# Patient Record
Sex: FEMALE
[De-identification: names, ages, dates, MRNs, and addresses within clinical notes are randomized per-mention and may not be internally consistent; named-entity substitution may affect disease eponyms.]

---

## 2017-10-10 ENCOUNTER — RX ONLY (RX ONLY)
Age: 77
End: 2017-10-10

## 2017-10-10 ENCOUNTER — APPOINTMENT (OUTPATIENT)
Age: 77
Setting detail: DERMATOLOGY
End: 2017-10-16

## 2017-10-10 DIAGNOSIS — D485 NEOPLASM OF UNCERTAIN BEHAVIOR OF SKIN: ICD-10-CM

## 2017-10-10 DIAGNOSIS — Z85.828 PERSONAL HISTORY OF OTHER MALIGNANT NEOPLASM OF SKIN: ICD-10-CM

## 2017-10-10 PROBLEM — D48.5 NEOPLASM OF UNCERTAIN BEHAVIOR OF SKIN: Status: ACTIVE | Noted: 2017-10-10

## 2017-10-10 PROBLEM — D23.9 OTHER BENIGN NEOPLASM OF SKIN, UNSPECIFIED: Status: ACTIVE | Noted: 2017-10-10

## 2017-10-10 PROCEDURE — OTHER BIOPSY BY SHAVE METHOD: OTHER

## 2017-10-10 PROCEDURE — 99213 OFFICE O/P EST LOW 20 MIN: CPT | Mod: 25

## 2017-10-10 PROCEDURE — OTHER MIPS QUALITY: OTHER

## 2017-10-10 PROCEDURE — OTHER COUNSELING: OTHER

## 2017-10-10 PROCEDURE — 11100: CPT

## 2017-10-10 RX ORDER — SALICYLIC ACID 60 MG/G
CREAM TOPICAL
Qty: 1 | Refills: 3 | Status: ERX

## 2017-10-10 ASSESSMENT — LOCATION SIMPLE DESCRIPTION DERM: LOCATION SIMPLE: NOSE

## 2017-10-10 ASSESSMENT — LOCATION ZONE DERM: LOCATION ZONE: NOSE

## 2017-10-10 ASSESSMENT — LOCATION DETAILED DESCRIPTION DERM: LOCATION DETAILED: NASAL DORSUM

## 2017-10-10 NOTE — PROCEDURE: MIPS QUALITY
Quality 431: Preventive Care And Screening: Unhealthy Alcohol Use - Screening: Patient screened for unhealthy alcohol use using a single question and scores less than 2 times per year
Quality 110: Preventive Care And Screening: Influenza Immunization: Influenza Immunization Administered during Influenza season
Quality 111:Pneumonia Vaccination Status For Older Adults: Pneumococcal Vaccination Previously Received
Quality 226: Preventive Care And Screening: Tobacco Use: Screening And Cessation Intervention: Patient screened for tobacco and is an ex-smoker
Quality 154 Part A: Falls: Risk Assessment (Should Be Reported With Measure 155.): Falls risk assessment completed and documented in the past 12 months.
Detail Level: Detailed
Quality 154 Part B: Falls: Risk Screening (Should Be Reported With Measure 155.): Patient screened for future fall risk; documentation of no falls in the past year or only one fall without injury in the past year

## 2017-10-10 NOTE — PROCEDURE: BIOPSY BY SHAVE METHOD
Curettage Text: The wound bed was treated with curettage after the biopsy was performed.
Detail Level: Simple
Bill For Surgical Tray: no
Electrodesiccation And Curettage Text: The wound bed was treated with electrodesiccation and curettage after the biopsy was performed.
Post-Care Instructions: I reviewed with the patient in detail post-care instructions. Patient is to keep the biopsy site dry overnight, and then apply bacitracin twice daily until healed. Patient may apply hydrogen peroxide soaks to remove any crusting.
Electrodesiccation Text: The wound bed was treated with electrodesiccation after the biopsy was performed.
Silver Nitrate Text: The wound bed was treated with silver nitrate after the biopsy was performed.
Consent: Written consent was obtained and risks were reviewed including but not limited to scarring, infection, bleeding, scabbing, incomplete removal, nerve damage and allergy to anesthesia.
Hemostasis: Aluminum Chloride
Notification Instructions: Patient will be notified of biopsy results. However, patient instructed to call the office if not contacted within 2 weeks.
Biopsy Type: H and E
Additional Anesthesia Volume In Cc (Will Not Render If 0): 0
Anesthesia Volume In Cc (Will Not Render If 0): 2
Dressing: bandage
Billing Type: Third-Party Bill
Wound Care: Vaseline
Cryotherapy Text: The wound bed was treated with cryotherapy after the biopsy was performed.
Biopsy Method: Dermablade
Anesthesia Type: 0.5% bupivacaine with 1:200,000 epinephrine
Type Of Destruction Used: Curettage

## 2017-10-18 ENCOUNTER — APPOINTMENT (OUTPATIENT)
Age: 77
Setting detail: DERMATOLOGY
End: 2017-10-18

## 2017-11-30 ENCOUNTER — APPOINTMENT (OUTPATIENT)
Age: 77
Setting detail: DERMATOLOGY
End: 2017-12-04

## 2017-11-30 DIAGNOSIS — B07.8 OTHER VIRAL WARTS: ICD-10-CM

## 2017-11-30 DIAGNOSIS — L81.4 OTHER MELANIN HYPERPIGMENTATION: ICD-10-CM

## 2017-11-30 PROCEDURE — 99213 OFFICE O/P EST LOW 20 MIN: CPT | Mod: 25

## 2017-11-30 PROCEDURE — OTHER LIQUID NITROGEN: OTHER

## 2017-11-30 PROCEDURE — OTHER COUNSELING: OTHER

## 2017-11-30 PROCEDURE — OTHER CURETTAGE AND DESTRUCTION: OTHER

## 2017-11-30 PROCEDURE — OTHER MIPS QUALITY: OTHER

## 2017-11-30 PROCEDURE — 17110 DESTRUCT B9 LESION 1-14: CPT

## 2017-11-30 ASSESSMENT — LOCATION SIMPLE DESCRIPTION DERM
LOCATION SIMPLE: NOSE
LOCATION SIMPLE: RIGHT FOREARM

## 2017-11-30 ASSESSMENT — LOCATION ZONE DERM
LOCATION ZONE: NOSE
LOCATION ZONE: ARM

## 2017-11-30 ASSESSMENT — LOCATION DETAILED DESCRIPTION DERM
LOCATION DETAILED: RIGHT VENTRAL LATERAL DISTAL FOREARM
LOCATION DETAILED: NASAL ROOT

## 2017-11-30 NOTE — PROCEDURE: CURETTAGE AND DESTRUCTION
Bill For Surgical Tray: no
Size Of Lesion After Curettage: 0
Anesthesia Type: 1% lidocaine with epinephrine
Additional Information: (Optional): The wound was cleaned, and a pressure dressing was applied.  The patient received detailed post-op instructions.
Size Of Lesion In Cm: 0.5
Cautery Type: electrodesiccation
Detail Level: Detailed
Number Of Curettages: 3
What Was Performed First?: Curettage
Post-Care Instructions: I reviewed with the patient in detail post-care instructions. Patient is to keep the area dry for 48 hours, and not to engage in any swimming until the area is healed. Should the patient develop any fevers, chills, bleeding, severe pain patient will contact the office immediately.
Consent was obtained from the patient. The risks, benefits and alternatives to therapy were discussed in detail. Specifically, the risks of infection, scarring, bleeding, prolonged wound healing, nerve injury, incomplete removal, allergy to anesthesia and recurrence were addressed. Alternatives to ED&C, such as: surgical removal and XRT were also discussed.  Prior to the procedure, the treatment site was clearly identified and confirmed by the patient. All components of Universal Protocol/PAUSE Rule completed.
Anesthesia Volume In Cc: -

## 2017-11-30 NOTE — PROCEDURE: LIQUID NITROGEN
Add 52 Modifier (Optional): no
Number Of Freeze-Thaw Cycles: 4 freeze-thaw cycles
Pared With?: 15 blade
Detail Level: Zone
Render Post-Care Instructions In Note?: yes
Medical Necessity Information: It is in your best interest to select a reason for this procedure from the list below. All of these items fulfill various CMS LCD requirements except the new and changing color options.
Consent: Verbal consent

## 2017-11-30 NOTE — PROCEDURE: MIPS QUALITY
Quality 154 Part B: Falls: Risk Screening (Should Be Reported With Measure 155.): Patient screened for future fall risk; documentation of no falls in the past year or only one fall without injury in the past year
Quality 110: Preventive Care And Screening: Influenza Immunization: Influenza Immunization Administered during Influenza season
Quality 431: Preventive Care And Screening: Unhealthy Alcohol Use - Screening: Patient screened for unhealthy alcohol use using a single question and scores less than 2 times per year
Quality 226: Preventive Care And Screening: Tobacco Use: Screening And Cessation Intervention: Patient screened for tobacco and is an ex-smoker
Quality 111:Pneumonia Vaccination Status For Older Adults: Pneumococcal Vaccination Previously Received
Quality 154 Part A: Falls: Risk Assessment (Should Be Reported With Measure 155.): Falls risk assessment completed and documented in the past 12 months.
Detail Level: Detailed

## 2018-08-29 ENCOUNTER — APPOINTMENT (OUTPATIENT)
Age: 78
Setting detail: DERMATOLOGY
End: 2018-09-06

## 2018-08-29 DIAGNOSIS — D22 MELANOCYTIC NEVI: ICD-10-CM

## 2018-08-29 DIAGNOSIS — Z71.89 OTHER SPECIFIED COUNSELING: ICD-10-CM

## 2018-08-29 DIAGNOSIS — L82.1 OTHER SEBORRHEIC KERATOSIS: ICD-10-CM

## 2018-08-29 DIAGNOSIS — L82.0 INFLAMED SEBORRHEIC KERATOSIS: ICD-10-CM

## 2018-08-29 DIAGNOSIS — L81.4 OTHER MELANIN HYPERPIGMENTATION: ICD-10-CM

## 2018-08-29 PROBLEM — D22.9 MELANOCYTIC NEVI, UNSPECIFIED: Status: ACTIVE | Noted: 2018-08-29

## 2018-08-29 PROCEDURE — OTHER COUNSELING: OTHER

## 2018-08-29 PROCEDURE — 99214 OFFICE O/P EST MOD 30 MIN: CPT | Mod: 25

## 2018-08-29 PROCEDURE — 17111 DESTRUCT LESION 15 OR MORE: CPT

## 2018-08-29 PROCEDURE — OTHER PRESCRIPTION: OTHER

## 2018-08-29 PROCEDURE — OTHER LIQUID NITROGEN: OTHER

## 2018-08-29 PROCEDURE — OTHER MIPS QUALITY: OTHER

## 2018-08-29 RX ORDER — AMMONIUM LACTATE 12 G/100G
CREAM TOPICAL
Qty: 160 | Refills: 3 | Status: ERX | COMMUNITY
Start: 2018-08-29

## 2018-08-29 ASSESSMENT — LOCATION ZONE DERM
LOCATION ZONE: NECK
LOCATION ZONE: TRUNK
LOCATION ZONE: ARM

## 2018-08-29 ASSESSMENT — LOCATION SIMPLE DESCRIPTION DERM
LOCATION SIMPLE: CHEST
LOCATION SIMPLE: RIGHT ANTERIOR NECK
LOCATION SIMPLE: LEFT CLAVICULAR SKIN
LOCATION SIMPLE: RIGHT CLAVICULAR SKIN
LOCATION SIMPLE: RIGHT SHOULDER
LOCATION SIMPLE: LEFT ANTERIOR NECK

## 2018-08-29 ASSESSMENT — LOCATION DETAILED DESCRIPTION DERM
LOCATION DETAILED: RIGHT INFERIOR ANTERIOR NECK
LOCATION DETAILED: RIGHT ANTERIOR SHOULDER
LOCATION DETAILED: LEFT SUPERIOR LATERAL NECK
LOCATION DETAILED: RIGHT SUPERIOR ANTERIOR NECK
LOCATION DETAILED: RIGHT CLAVICULAR NECK
LOCATION DETAILED: LEFT SUPERIOR ANTERIOR NECK
LOCATION DETAILED: LEFT LATERAL SUPERIOR CHEST
LOCATION DETAILED: LEFT INFERIOR ANTERIOR NECK
LOCATION DETAILED: RIGHT MEDIAL SUPERIOR CHEST
LOCATION DETAILED: LEFT CLAVICULAR SKIN
LOCATION DETAILED: RIGHT CLAVICULAR SKIN

## 2018-08-29 NOTE — PROCEDURE: LIQUID NITROGEN
Add 52 Modifier (Optional): no
Detail Level: Zone
Number Of Freeze-Thaw Cycles: 4 freeze-thaw cycles
Render Post-Care Instructions In Note?: yes
Pared With?: 15 blade
Medical Necessity Information: It is in your best interest to select a reason for this procedure from the list below. All of these items fulfill various CMS LCD requirements except the new and changing color options.
Consent: Verbal consent

## 2018-08-29 NOTE — PROCEDURE: MIPS QUALITY
Detail Level: Detailed
Quality 431: Preventive Care And Screening: Unhealthy Alcohol Use - Screening: Patient screened for unhealthy alcohol use using a single question and scores less than 2 times per year
Quality 154 Part B: Falls: Risk Screening (Should Be Reported With Measure 155.): Patient screened for future fall risk; documentation of no falls in the past year or only one fall without injury in the past year
Quality 111:Pneumonia Vaccination Status For Older Adults: Pneumococcal Vaccination Previously Received
Quality 226: Preventive Care And Screening: Tobacco Use: Screening And Cessation Intervention: Patient screened for tobacco and is an ex-smoker
Quality 154 Part A: Falls: Risk Assessment (Should Be Reported With Measure 155.): Falls risk assessment completed and documented in the past 12 months.
Quality 110: Preventive Care And Screening: Influenza Immunization: Influenza Immunization Administered during Influenza season

## 2019-02-28 ENCOUNTER — APPOINTMENT (OUTPATIENT)
Age: 79
Setting detail: DERMATOLOGY
End: 2019-03-01

## 2019-02-28 DIAGNOSIS — L82.1 OTHER SEBORRHEIC KERATOSIS: ICD-10-CM

## 2019-02-28 DIAGNOSIS — L81.4 OTHER MELANIN HYPERPIGMENTATION: ICD-10-CM

## 2019-02-28 DIAGNOSIS — D22 MELANOCYTIC NEVI: ICD-10-CM

## 2019-02-28 DIAGNOSIS — L82.0 INFLAMED SEBORRHEIC KERATOSIS: ICD-10-CM

## 2019-02-28 DIAGNOSIS — Z71.89 OTHER SPECIFIED COUNSELING: ICD-10-CM

## 2019-02-28 PROBLEM — D22.5 MELANOCYTIC NEVI OF TRUNK: Status: ACTIVE | Noted: 2019-02-28

## 2019-02-28 PROCEDURE — 17111 DESTRUCT LESION 15 OR MORE: CPT

## 2019-02-28 PROCEDURE — OTHER COUNSELING: OTHER

## 2019-02-28 PROCEDURE — OTHER MIPS QUALITY: OTHER

## 2019-02-28 PROCEDURE — OTHER LIQUID NITROGEN: OTHER

## 2019-02-28 PROCEDURE — 99213 OFFICE O/P EST LOW 20 MIN: CPT | Mod: 25

## 2019-02-28 ASSESSMENT — LOCATION SIMPLE DESCRIPTION DERM
LOCATION SIMPLE: LEFT UPPER BACK
LOCATION SIMPLE: RIGHT SHOULDER
LOCATION SIMPLE: RIGHT CLAVICULAR SKIN
LOCATION SIMPLE: LEFT ANTERIOR NECK
LOCATION SIMPLE: LEFT CLAVICULAR SKIN
LOCATION SIMPLE: LEFT CHEEK
LOCATION SIMPLE: RIGHT UPPER BACK
LOCATION SIMPLE: RIGHT CHEEK
LOCATION SIMPLE: POSTERIOR NECK
LOCATION SIMPLE: RIGHT ANTERIOR NECK
LOCATION SIMPLE: ABDOMEN

## 2019-02-28 ASSESSMENT — LOCATION DETAILED DESCRIPTION DERM
LOCATION DETAILED: RIGHT POSTERIOR SHOULDER
LOCATION DETAILED: RIGHT INFERIOR ANTERIOR NECK
LOCATION DETAILED: LEFT CENTRAL MALAR CHEEK
LOCATION DETAILED: RIGHT MEDIAL TRAPEZIAL NECK
LOCATION DETAILED: LEFT LATERAL TRAPEZIAL NECK
LOCATION DETAILED: LEFT CLAVICULAR SKIN
LOCATION DETAILED: PERIUMBILICAL SKIN
LOCATION DETAILED: RIGHT MEDIAL MALAR CHEEK
LOCATION DETAILED: RIGHT CENTRAL MALAR CHEEK
LOCATION DETAILED: LEFT SUPERIOR LATERAL NECK
LOCATION DETAILED: RIGHT SUPERIOR UPPER BACK
LOCATION DETAILED: LEFT SUPERIOR MEDIAL UPPER BACK
LOCATION DETAILED: LEFT MEDIAL TRAPEZIAL NECK
LOCATION DETAILED: LEFT SUPERIOR CENTRAL MALAR CHEEK
LOCATION DETAILED: LEFT SUPERIOR UPPER BACK
LOCATION DETAILED: RIGHT CLAVICULAR SKIN
LOCATION DETAILED: RIGHT CLAVICULAR NECK
LOCATION DETAILED: RIGHT SUPERIOR ANTERIOR NECK
LOCATION DETAILED: RIGHT SUPERIOR MEDIAL UPPER BACK

## 2019-02-28 ASSESSMENT — LOCATION ZONE DERM
LOCATION ZONE: NECK
LOCATION ZONE: ARM
LOCATION ZONE: TRUNK
LOCATION ZONE: FACE

## 2019-02-28 NOTE — PROCEDURE: MIPS QUALITY
Detail Level: Detailed
Quality 431: Preventive Care And Screening: Unhealthy Alcohol Use - Screening: Patient screened for unhealthy alcohol use using a single question and scores less than 2 times per year
Quality 154 Part A: Falls: Risk Assessment (Should Be Reported With Measure 155.): Falls risk assessment completed and documented in the past 12 months.
Quality 226: Preventive Care And Screening: Tobacco Use: Screening And Cessation Intervention: Patient screened for tobacco and is an ex-smoker
Quality 110: Preventive Care And Screening: Influenza Immunization: Influenza Immunization Administered during Influenza season
Quality 111:Pneumonia Vaccination Status For Older Adults: Pneumococcal Vaccination Previously Received
Quality 154 Part B: Falls: Risk Screening (Should Be Reported With Measure 155.): Patient screened for future fall risk; documentation of no falls in the past year or only one fall without injury in the past year

## 2019-08-28 ENCOUNTER — APPOINTMENT (OUTPATIENT)
Age: 79
Setting detail: DERMATOLOGY
End: 2019-08-29

## 2019-08-28 DIAGNOSIS — L57.0 ACTINIC KERATOSIS: ICD-10-CM

## 2019-08-28 DIAGNOSIS — L82.1 OTHER SEBORRHEIC KERATOSIS: ICD-10-CM

## 2019-08-28 DIAGNOSIS — L82.0 INFLAMED SEBORRHEIC KERATOSIS: ICD-10-CM

## 2019-08-28 DIAGNOSIS — D22 MELANOCYTIC NEVI: ICD-10-CM

## 2019-08-28 PROBLEM — D22.9 MELANOCYTIC NEVI, UNSPECIFIED: Status: ACTIVE | Noted: 2019-08-28

## 2019-08-28 PROCEDURE — 17110 DESTRUCT B9 LESION 1-14: CPT

## 2019-08-28 PROCEDURE — OTHER COUNSELING: OTHER

## 2019-08-28 PROCEDURE — 99213 OFFICE O/P EST LOW 20 MIN: CPT | Mod: 25

## 2019-08-28 PROCEDURE — OTHER LIQUID NITROGEN: OTHER

## 2019-08-28 PROCEDURE — OTHER MIPS QUALITY: OTHER

## 2019-08-28 PROCEDURE — 17000 DESTRUCT PREMALG LESION: CPT | Mod: 59

## 2019-08-28 ASSESSMENT — LOCATION DETAILED DESCRIPTION DERM
LOCATION DETAILED: RIGHT SUPERIOR MEDIAL UPPER BACK
LOCATION DETAILED: NASAL DORSUM
LOCATION DETAILED: RIGHT LATERAL TRAPEZIAL NECK
LOCATION DETAILED: LEFT SUPERIOR UPPER BACK
LOCATION DETAILED: RIGHT SUPERIOR UPPER BACK
LOCATION DETAILED: PERIUMBILICAL SKIN
LOCATION DETAILED: RIGHT ANTERIOR DISTAL THIGH
LOCATION DETAILED: LEFT SUPERIOR MEDIAL UPPER BACK
LOCATION DETAILED: RIGHT DISTAL PRETIBIAL REGION
LOCATION DETAILED: EPIGASTRIC SKIN
LOCATION DETAILED: LEFT LATERAL TRAPEZIAL NECK

## 2019-08-28 ASSESSMENT — LOCATION SIMPLE DESCRIPTION DERM
LOCATION SIMPLE: RIGHT THIGH
LOCATION SIMPLE: ABDOMEN
LOCATION SIMPLE: RIGHT UPPER BACK
LOCATION SIMPLE: POSTERIOR NECK
LOCATION SIMPLE: LEFT UPPER BACK
LOCATION SIMPLE: NOSE
LOCATION SIMPLE: RIGHT PRETIBIAL REGION

## 2019-08-28 ASSESSMENT — LOCATION ZONE DERM
LOCATION ZONE: TRUNK
LOCATION ZONE: LEG
LOCATION ZONE: NECK
LOCATION ZONE: NOSE

## 2019-08-28 NOTE — PROCEDURE: LIQUID NITROGEN
Render Note In Bullet Format When Appropriate: No
Consent: Verbal consent
Medical Necessity Information: It is in your best interest to select a reason for this procedure from the list below. All of these items fulfill various CMS LCD requirements except the new and changing color options.
Detail Level: Zone
Duration Of Freeze Thaw-Cycle (Seconds): 3
Render Post-Care Instructions In Note?: yes
Number Of Freeze-Thaw Cycles: 2 freeze-thaw cycles
Number Of Freeze-Thaw Cycles: 3 freeze-thaw cycles

## 2019-08-28 NOTE — PROCEDURE: MIPS QUALITY
Quality 154 Part B: Falls: Risk Screening (Should Be Reported With Measure 155.): Patient screened for future fall risk; documentation of no falls in the past year or only one fall without injury in the past year
Detail Level: Detailed
Quality 111:Pneumonia Vaccination Status For Older Adults: Pneumococcal Vaccination Previously Received
Quality 110: Preventive Care And Screening: Influenza Immunization: Influenza Immunization Administered during Influenza season
Quality 154 Part A: Falls: Risk Assessment (Should Be Reported With Measure 155.): Falls risk assessment completed and documented in the past 12 months.
Quality 431: Preventive Care And Screening: Unhealthy Alcohol Use - Screening: Patient screened for unhealthy alcohol use using a single question and scores less than 2 times per year

## 2020-02-13 ENCOUNTER — APPOINTMENT (OUTPATIENT)
Age: 80
Setting detail: DERMATOLOGY
End: 2020-02-14

## 2020-02-13 DIAGNOSIS — D485 NEOPLASM OF UNCERTAIN BEHAVIOR OF SKIN: ICD-10-CM

## 2020-02-13 DIAGNOSIS — L82.0 INFLAMED SEBORRHEIC KERATOSIS: ICD-10-CM

## 2020-02-13 DIAGNOSIS — Z85.828 PERSONAL HISTORY OF OTHER MALIGNANT NEOPLASM OF SKIN: ICD-10-CM

## 2020-02-13 DIAGNOSIS — L82.1 OTHER SEBORRHEIC KERATOSIS: ICD-10-CM

## 2020-02-13 DIAGNOSIS — Z87.2 PERSONAL HISTORY OF DISEASES OF THE SKIN AND SUBCUTANEOUS TISSUE: ICD-10-CM

## 2020-02-13 DIAGNOSIS — L81.4 OTHER MELANIN HYPERPIGMENTATION: ICD-10-CM

## 2020-02-13 DIAGNOSIS — Z71.89 OTHER SPECIFIED COUNSELING: ICD-10-CM

## 2020-02-13 PROBLEM — D48.5 NEOPLASM OF UNCERTAIN BEHAVIOR OF SKIN: Status: ACTIVE | Noted: 2020-02-13

## 2020-02-13 PROCEDURE — OTHER BIOPSY BY SHAVE METHOD: OTHER

## 2020-02-13 PROCEDURE — 11102 TANGNTL BX SKIN SINGLE LES: CPT | Mod: 59

## 2020-02-13 PROCEDURE — 17110 DESTRUCT B9 LESION 1-14: CPT

## 2020-02-13 PROCEDURE — 99213 OFFICE O/P EST LOW 20 MIN: CPT | Mod: 25

## 2020-02-13 PROCEDURE — OTHER MIPS QUALITY: OTHER

## 2020-02-13 PROCEDURE — OTHER COUNSELING: OTHER

## 2020-02-13 PROCEDURE — 11103 TANGNTL BX SKIN EA SEP/ADDL: CPT

## 2020-02-13 PROCEDURE — OTHER LIQUID NITROGEN: OTHER

## 2020-02-13 PROCEDURE — OTHER REASSURANCE: OTHER

## 2020-02-13 PROCEDURE — OTHER OBSERVATION: OTHER

## 2020-02-13 ASSESSMENT — LOCATION DETAILED DESCRIPTION DERM
LOCATION DETAILED: RIGHT MID-UPPER BACK
LOCATION DETAILED: RIGHT NASAL ALA
LOCATION DETAILED: RIGHT PROXIMAL PRETIBIAL REGION
LOCATION DETAILED: RIGHT RIB CAGE
LOCATION DETAILED: RIGHT MEDIAL BREAST 3-4:00 REGION
LOCATION DETAILED: LEFT SUPERIOR MEDIAL LOWER BACK
LOCATION DETAILED: RIGHT LATERAL BREAST 6-7:00 REGION
LOCATION DETAILED: RIGHT DORSAL WRIST
LOCATION DETAILED: RIGHT DISTAL PRETIBIAL REGION
LOCATION DETAILED: LEFT MEDIAL BREAST 6-7:00 REGION
LOCATION DETAILED: RIGHT SUPERIOR MEDIAL MIDBACK
LOCATION DETAILED: RIGHT LATERAL SUPERIOR CHEST
LOCATION DETAILED: LEFT MEDIAL SUPERIOR CHEST
LOCATION DETAILED: RIGHT INFERIOR UPPER BACK
LOCATION DETAILED: LEFT LATERAL SUPERIOR CHEST
LOCATION DETAILED: LEFT INFERIOR UPPER BACK
LOCATION DETAILED: LEFT MEDIAL BREAST 7-8:00 REGION
LOCATION DETAILED: LEFT MID-UPPER BACK
LOCATION DETAILED: LEFT DORSAL WRIST

## 2020-02-13 ASSESSMENT — LOCATION SIMPLE DESCRIPTION DERM
LOCATION SIMPLE: RIGHT UPPER BACK
LOCATION SIMPLE: RIGHT BREAST
LOCATION SIMPLE: RIGHT WRIST
LOCATION SIMPLE: CHEST
LOCATION SIMPLE: LEFT LOWER BACK
LOCATION SIMPLE: RIGHT LOWER BACK
LOCATION SIMPLE: ABDOMEN
LOCATION SIMPLE: RIGHT NOSE
LOCATION SIMPLE: LEFT BREAST
LOCATION SIMPLE: LEFT WRIST
LOCATION SIMPLE: RIGHT PRETIBIAL REGION
LOCATION SIMPLE: LEFT UPPER BACK

## 2020-02-13 ASSESSMENT — LOCATION ZONE DERM
LOCATION ZONE: TRUNK
LOCATION ZONE: LEG
LOCATION ZONE: NOSE
LOCATION ZONE: ARM

## 2020-02-13 NOTE — PROCEDURE: BIOPSY BY SHAVE METHOD
Post-Care Instructions: I reviewed with the patient in detail post-care instructions. Patient is to keep the biopsy site dry overnight, and then apply bacitracin twice daily until healed. Patient may apply hydrogen peroxide soaks to remove any crusting.
Depth Of Biopsy: dermis
X Size Of Lesion In Cm: 0
Hide Accession Number?: No
Biopsy Type: H and E
Type Of Destruction Used: Curettage
Consent: Written consent was obtained and risks were reviewed including but not limited to scarring, infection, bleeding, scabbing, incomplete removal, nerve damage and allergy to anesthesia.
Detail Level: Detailed
Dressing: bandage
Billing Type: Third-Party Bill
Curettage Text: The wound bed was treated with curettage after the biopsy was performed.
Was A Bandage Applied: Yes
Wound Care: Petrolatum
Silver Nitrate Text: The wound bed was treated with silver nitrate after the biopsy was performed.
Anesthesia Volume In Cc (Will Not Render If 0): 0.5
Biopsy Method: Personna blade
Notification Instructions: Patient will be notified of biopsy results. However, patient instructed to call the office if not contacted within 2 weeks.
Hemostasis: Aluminum Chloride
Cryotherapy Text: The wound bed was treated with cryotherapy after the biopsy was performed.
Electrodesiccation And Curettage Text: The wound bed was treated with electrodesiccation and curettage after the biopsy was performed.
Electrodesiccation Text: The wound bed was treated with electrodesiccation after the biopsy was performed.
Anesthesia Type: 1% lidocaine with epinephrine

## 2020-02-13 NOTE — PROCEDURE: MIPS QUALITY
Quality 154 Part B: Falls: Risk Screening (Should Be Reported With Measure 155.): Patient screened for future fall risk; documentation of no falls in the past year or only one fall without injury in the past year
Quality 47: Advance Care Plan: Advance care planning not documented, reason not otherwise specified.
Quality 154 Part A: Falls: Risk Assessment (Should Be Reported With Measure 155.): Falls risk assessment completed and documented in the past 12 months.
Quality 431: Preventive Care And Screening: Unhealthy Alcohol Use - Screening: Patient screened for unhealthy alcohol use using a single question and scores less than 2 times per year
Quality 110: Preventive Care And Screening: Influenza Immunization: Influenza Immunization Administered during Influenza season
Quality 111:Pneumonia Vaccination Status For Older Adults: Pneumococcal Vaccination Previously Received
Quality 130: Documentation Of Current Medications In The Medical Record: Current Medications Documented
Detail Level: Detailed
Quality 226: Preventive Care And Screening: Tobacco Use: Screening And Cessation Intervention: Patient screened for tobacco use and is an ex/non-smoker
Quality 265: Biopsy Follow-Up: Biopsy results reviewed, communicated, tracked, and documented

## 2020-02-13 NOTE — PROCEDURE: LIQUID NITROGEN
Medical Necessity Information: It is in your best interest to select a reason for this procedure from the list below. All of these items fulfill various CMS LCD requirements except the new and changing color options.
Detail Level: Detailed
Post-Care Instructions: I reviewed with the patient in detail post-care instructions. Patient is to wear sunprotection, and avoid picking at any of the treated lesions. Pt may apply Vaseline to crusted or scabbing areas.
Include Z78.9 (Other Specified Conditions Influencing Health Status) As An Associated Diagnosis?: No
Consent: The patient's consent was obtained including but not limited to risks of crusting, scabbing, blistering, scarring, darker or lighter pigmentary change, recurrence, incomplete removal and infection.
Number Of Freeze-Thaw Cycles: 3 freeze-thaw cycles
Medical Necessity Clause: This procedure was medically necessary because the lesions that were treated were:
Duration Of Freeze Thaw-Cycle (Seconds): 5-10

## 2020-06-02 ENCOUNTER — APPOINTMENT (OUTPATIENT)
Age: 80
Setting detail: DERMATOLOGY
End: 2020-06-03

## 2020-06-02 DIAGNOSIS — L82.0 INFLAMED SEBORRHEIC KERATOSIS: ICD-10-CM

## 2020-06-02 DIAGNOSIS — R22.9 LOCALIZED SWELLING, MASS AND LUMP, UNSPECIFIED: ICD-10-CM

## 2020-06-02 DIAGNOSIS — L57.0 ACTINIC KERATOSIS: ICD-10-CM

## 2020-06-02 PROCEDURE — OTHER DIAGNOSIS COMMENT: OTHER

## 2020-06-02 PROCEDURE — 17000 DESTRUCT PREMALG LESION: CPT | Mod: 59

## 2020-06-02 PROCEDURE — OTHER LIQUID NITROGEN: OTHER

## 2020-06-02 PROCEDURE — 99213 OFFICE O/P EST LOW 20 MIN: CPT | Mod: 25

## 2020-06-02 PROCEDURE — OTHER REASSURANCE: OTHER

## 2020-06-02 PROCEDURE — OTHER MIPS QUALITY: OTHER

## 2020-06-02 PROCEDURE — OTHER COUNSELING: OTHER

## 2020-06-02 PROCEDURE — 17111 DESTRUCT LESION 15 OR MORE: CPT

## 2020-06-02 ASSESSMENT — LOCATION ZONE DERM
LOCATION ZONE: NECK
LOCATION ZONE: ARM
LOCATION ZONE: LEG
LOCATION ZONE: TRUNK

## 2020-06-02 ASSESSMENT — LOCATION DETAILED DESCRIPTION DERM
LOCATION DETAILED: RIGHT POSTERIOR SHOULDER
LOCATION DETAILED: LOWER STERNUM
LOCATION DETAILED: LEFT MID-UPPER BACK
LOCATION DETAILED: LEFT MEDIAL BREAST 8-9:00 REGION
LOCATION DETAILED: RIGHT SUPERIOR UPPER BACK
LOCATION DETAILED: LEFT MEDIAL BREAST 7-8:00 REGION
LOCATION DETAILED: RIGHT PROXIMAL PRETIBIAL REGION
LOCATION DETAILED: INFERIOR THORACIC SPINE
LOCATION DETAILED: LEFT MEDIAL BREAST 6-7:00 REGION
LOCATION DETAILED: LEFT INFERIOR UPPER BACK
LOCATION DETAILED: RIGHT LATERAL TRAPEZIAL NECK
LOCATION DETAILED: RIGHT MEDIAL UPPER BACK
LOCATION DETAILED: RIGHT DISTAL PRETIBIAL REGION
LOCATION DETAILED: RIGHT MEDIAL BREAST 5-6:00 REGION
LOCATION DETAILED: LEFT SUPERIOR MEDIAL UPPER BACK
LOCATION DETAILED: RIGHT INFRAMAMMARY CREASE (INNER QUADRANT)
LOCATION DETAILED: MIDDLE STERNUM

## 2020-06-02 ASSESSMENT — LOCATION SIMPLE DESCRIPTION DERM
LOCATION SIMPLE: RIGHT UPPER BACK
LOCATION SIMPLE: POSTERIOR NECK
LOCATION SIMPLE: CHEST
LOCATION SIMPLE: LEFT UPPER BACK
LOCATION SIMPLE: RIGHT PRETIBIAL REGION
LOCATION SIMPLE: UPPER BACK
LOCATION SIMPLE: LEFT BREAST
LOCATION SIMPLE: RIGHT BREAST
LOCATION SIMPLE: RIGHT SHOULDER

## 2020-06-02 NOTE — PROCEDURE: MIPS QUALITY
Quality 154 Part B: Falls: Risk Screening (Should Be Reported With Measure 155.): Patient screened for future fall risk; documentation of no falls in the past year or only one fall without injury in the past year
Quality 50: Urinary Incontinence: Plan Of Care For Urinary Incontinence In Women Aged 65 Years And Older: Urinary incontinence plan of care not documented, reason not otherwise specified
Quality 317: Preventative Care And Screening: Screening For High Blood Pressure And Follow-Up Documented: Patient refuses to participate (either BP measurement or follow-up).
Quality 155: Falls Plan Of Care: Plan of Care not Documented, Reason not Otherwise Specified
Quality 265: Biopsy Follow-Up: Biopsy results reviewed, communicated, tracked, and documented
Quality 154 Part A: Falls: Risk Assessment (Should Be Reported With Measure 155.): Falls risk assessment completed and documented in the past 12 months.
Quality 128: Preventive Care And Screening: Body Mass Index (Bmi) Screening And Follow-Up Plan: BMI not documented, reason not otherwise specified.
Quality 110: Preventive Care And Screening: Influenza Immunization: Influenza Immunization Administered during Influenza season
Quality 48: Urinary Incontinence: Assessment Of Presence Or Absence Of Urinary Incontinence In Women Aged 65 Years And Older: Presence or absence of urinary incontinence not assessed, reason not otherwise specified
Quality 47: Advance Care Plan: Advance Care Planning discussed and documented; advance care plan or surrogate decision maker documented in the medical record.
Quality 111:Pneumonia Vaccination Status For Older Adults: Pneumococcal Vaccination Previously Received
Detail Level: Detailed
Quality 134: Screening For Clinical Depression And Follow-Up Plan: The patient was screened for depression and the screen was negative and no follow up required
Quality 130: Documentation Of Current Medications In The Medical Record: Current Medications Documented
Quality 226: Preventive Care And Screening: Tobacco Use: Screening And Cessation Intervention: Patient screened for tobacco use and is an ex/non-smoker
Quality 431: Preventive Care And Screening: Unhealthy Alcohol Use - Screening: Patient screened for unhealthy alcohol use using a single question and scores less than 2 times per year

## 2020-06-02 NOTE — PROCEDURE: LIQUID NITROGEN
Detail Level: Detailed
Duration Of Freeze Thaw-Cycle (Seconds): 4
Medical Necessity Information: It is in your best interest to select a reason for this procedure from the list below. All of these items fulfill various CMS LCD requirements except the new and changing color options.
Post-Care Instructions: I reviewed with the patient in detail post-care instructions. Patient is to wear sunprotection, and avoid picking at any of the treated lesions. Pt may apply Vaseline to crusted or scabbing areas.
Number Of Freeze-Thaw Cycles: 1 freeze-thaw cycle
Include Z78.9 (Other Specified Conditions Influencing Health Status) As An Associated Diagnosis?: No
Medical Necessity Clause: This procedure was medically necessary because the lesions that were treated were:
Consent: The patient's consent was obtained including but not limited to risks of crusting, scabbing, blistering, scarring, darker or lighter pigmentary change, recurrence, incomplete removal and infection.
Render Post-Care Instructions In Note?: yes

## 2020-08-13 ENCOUNTER — APPOINTMENT (OUTPATIENT)
Age: 80
Setting detail: DERMATOLOGY
End: 2020-08-18

## 2020-08-13 DIAGNOSIS — L81.0 POSTINFLAMMATORY HYPERPIGMENTATION: ICD-10-CM

## 2020-08-13 DIAGNOSIS — R22.9 LOCALIZED SWELLING, MASS AND LUMP, UNSPECIFIED: ICD-10-CM

## 2020-08-13 DIAGNOSIS — L81.4 OTHER MELANIN HYPERPIGMENTATION: ICD-10-CM

## 2020-08-13 DIAGNOSIS — L91.8 OTHER HYPERTROPHIC DISORDERS OF THE SKIN: ICD-10-CM

## 2020-08-13 DIAGNOSIS — L82.1 OTHER SEBORRHEIC KERATOSIS: ICD-10-CM

## 2020-08-13 DIAGNOSIS — Z87.2 PERSONAL HISTORY OF DISEASES OF THE SKIN AND SUBCUTANEOUS TISSUE: ICD-10-CM

## 2020-08-13 DIAGNOSIS — Z85.828 PERSONAL HISTORY OF OTHER MALIGNANT NEOPLASM OF SKIN: ICD-10-CM

## 2020-08-13 DIAGNOSIS — Z71.89 OTHER SPECIFIED COUNSELING: ICD-10-CM

## 2020-08-13 DIAGNOSIS — L82.0 INFLAMED SEBORRHEIC KERATOSIS: ICD-10-CM

## 2020-08-13 PROBLEM — D23.72 OTHER BENIGN NEOPLASM OF SKIN OF LEFT LOWER LIMB, INCLUDING HIP: Status: ACTIVE | Noted: 2020-08-13

## 2020-08-13 PROCEDURE — OTHER MIPS QUALITY: OTHER

## 2020-08-13 PROCEDURE — OTHER REASSURANCE: OTHER

## 2020-08-13 PROCEDURE — OTHER DIAGNOSIS COMMENT: OTHER

## 2020-08-13 PROCEDURE — 11200 RMVL SKIN TAGS UP TO&INC 15: CPT

## 2020-08-13 PROCEDURE — OTHER SKIN TAG REMOVAL: OTHER

## 2020-08-13 PROCEDURE — OTHER OBSERVATION: OTHER

## 2020-08-13 PROCEDURE — OTHER COUNSELING: OTHER

## 2020-08-13 PROCEDURE — 17111 DESTRUCT LESION 15 OR MORE: CPT | Mod: 59

## 2020-08-13 PROCEDURE — OTHER LIQUID NITROGEN: OTHER

## 2020-08-13 PROCEDURE — 99213 OFFICE O/P EST LOW 20 MIN: CPT | Mod: 25

## 2020-08-13 ASSESSMENT — LOCATION SIMPLE DESCRIPTION DERM
LOCATION SIMPLE: RIGHT PRETIBIAL REGION
LOCATION SIMPLE: LEFT WRIST
LOCATION SIMPLE: LEFT LOWER BACK
LOCATION SIMPLE: RIGHT ANTERIOR NECK
LOCATION SIMPLE: RIGHT NOSE
LOCATION SIMPLE: LEFT UPPER BACK
LOCATION SIMPLE: RIGHT BREAST
LOCATION SIMPLE: ABDOMEN
LOCATION SIMPLE: RIGHT WRIST
LOCATION SIMPLE: LEFT BREAST
LOCATION SIMPLE: RIGHT LOWER BACK
LOCATION SIMPLE: RIGHT UPPER BACK

## 2020-08-13 ASSESSMENT — LOCATION DETAILED DESCRIPTION DERM
LOCATION DETAILED: LEFT RIB CAGE
LOCATION DETAILED: LEFT MID-UPPER BACK
LOCATION DETAILED: RIGHT MEDIAL BREAST 4-5:00 REGION
LOCATION DETAILED: RIGHT MEDIAL BREAST 3-4:00 REGION
LOCATION DETAILED: RIGHT INFRAMAMMARY CREASE (INNER QUADRANT)
LOCATION DETAILED: LEFT DORSAL WRIST
LOCATION DETAILED: LEFT SUPERIOR LATERAL MIDBACK
LOCATION DETAILED: RIGHT MID-UPPER BACK
LOCATION DETAILED: RIGHT DORSAL WRIST
LOCATION DETAILED: LEFT LATERAL BREAST 5-6:00 REGION
LOCATION DETAILED: RIGHT RIB CAGE
LOCATION DETAILED: RIGHT INFERIOR LATERAL NECK
LOCATION DETAILED: RIGHT LATERAL BREAST 7-8:00 REGION
LOCATION DETAILED: RIGHT MEDIAL BREAST 5-6:00 REGION
LOCATION DETAILED: LEFT INFRAMAMMARY CREASE (INNER QUADRANT)
LOCATION DETAILED: LEFT MEDIAL BREAST 8-9:00 REGION
LOCATION DETAILED: EPIGASTRIC SKIN
LOCATION DETAILED: LEFT MEDIAL BREAST 6-7:00 REGION
LOCATION DETAILED: RIGHT SUPERIOR MEDIAL MIDBACK
LOCATION DETAILED: RIGHT CLAVICULAR NECK
LOCATION DETAILED: SUBXIPHOID
LOCATION DETAILED: LEFT INFERIOR UPPER BACK
LOCATION DETAILED: RIGHT MEDIAL DISTAL PRETIBIAL REGION
LOCATION DETAILED: RIGHT NASAL ALA
LOCATION DETAILED: RIGHT PROXIMAL PRETIBIAL REGION
LOCATION DETAILED: LEFT MEDIAL BREAST 7-8:00 REGION
LOCATION DETAILED: RIGHT INFERIOR UPPER BACK

## 2020-08-13 ASSESSMENT — LOCATION ZONE DERM
LOCATION ZONE: NECK
LOCATION ZONE: NOSE
LOCATION ZONE: ARM
LOCATION ZONE: TRUNK
LOCATION ZONE: LEG

## 2020-08-13 NOTE — PROCEDURE: MIPS QUALITY
Quality 128: Preventive Care And Screening: Body Mass Index (Bmi) Screening And Follow-Up Plan: BMI not documented, reason not otherwise specified.
Quality 111:Pneumonia Vaccination Status For Older Adults: Pneumococcal Vaccination Previously Received
Quality 317: Preventative Care And Screening: Screening For High Blood Pressure And Follow-Up Documented: Patient refuses to participate (either BP measurement or follow-up).
Quality 265: Biopsy Follow-Up: Biopsy results reviewed, communicated, tracked, and documented
Quality 134: Screening For Clinical Depression And Follow-Up Plan: The patient was screened for depression and the screen was negative and no follow up required
Quality 48: Urinary Incontinence: Assessment Of Presence Or Absence Of Urinary Incontinence In Women Aged 65 Years And Older: Presence or absence of urinary incontinence not assessed, reason not otherwise specified
Detail Level: Detailed
Quality 431: Preventive Care And Screening: Unhealthy Alcohol Use - Screening: Patient screened for unhealthy alcohol use using a single question and scores less than 2 times per year
Quality 154 Part A: Falls: Risk Assessment (Should Be Reported With Measure 155.): Falls risk assessment completed and documented in the past 12 months.
Quality 154 Part B: Falls: Risk Screening (Should Be Reported With Measure 155.): Patient screened for future fall risk; documentation of no falls in the past year or only one fall without injury in the past year
Quality 155: Falls Plan Of Care: Plan of Care not Documented, Reason not Otherwise Specified
Quality 50: Urinary Incontinence: Plan Of Care For Urinary Incontinence In Women Aged 65 Years And Older: Urinary incontinence plan of care not documented, reason not otherwise specified
Quality 130: Documentation Of Current Medications In The Medical Record: Current Medications Documented
Quality 226: Preventive Care And Screening: Tobacco Use: Screening And Cessation Intervention: Patient screened for tobacco use and is an ex/non-smoker
Quality 47: Advance Care Plan: Advance Care Planning discussed and documented; advance care plan or surrogate decision maker documented in the medical record.
Quality 110: Preventive Care And Screening: Influenza Immunization: Influenza Immunization Administered during Influenza season

## 2020-08-13 NOTE — PROCEDURE: LIQUID NITROGEN
Include Z78.9 (Other Specified Conditions Influencing Health Status) As An Associated Diagnosis?: No
Detail Level: Detailed
Number Of Freeze-Thaw Cycles: 1 freeze-thaw cycle
Post-Care Instructions: I reviewed with the patient in detail post-care instructions. Patient is to wear sunprotection, and avoid picking at any of the treated lesions. Pt may apply Vaseline to crusted or scabbing areas.
Render Post-Care Instructions In Note?: yes
Medical Necessity Information: It is in your best interest to select a reason for this procedure from the list below. All of these items fulfill various CMS LCD requirements except the new and changing color options.
Consent: The patient's consent was obtained including but not limited to risks of crusting, scabbing, blistering, scarring, darker or lighter pigmentary change, recurrence, incomplete removal and infection.
Medical Necessity Clause: This procedure was medically necessary because the lesions that were treated were:

## 2020-08-13 NOTE — PROCEDURE: SKIN TAG REMOVAL
Anesthesia Volume In Cc: 3
Detail Level: Detailed
Include Z78.9 (Other Specified Conditions Influencing Health Status) As An Associated Diagnosis?: No
Consent: Written consent obtained and the risks of skin tag removal was reviewed with the patient including but not limited to bleeding, pigmentary change, infection, pain, and remote possibility of scarring.
Medical Necessity Information: It is in your best interest to select a reason for this procedure from the list below. All of these items fulfill various CMS LCD requirements except the new and changing color options.
Add Associated Diagnoses If Applicable When Selecting Medical Necessity: Yes
Medical Necessity Clause: This procedure was medically necessary because the lesions that were treated were:

## 2021-04-20 ENCOUNTER — APPOINTMENT (OUTPATIENT)
Age: 81
Setting detail: DERMATOLOGY
End: 2021-04-20

## 2021-04-20 DIAGNOSIS — Z85.828 PERSONAL HISTORY OF OTHER MALIGNANT NEOPLASM OF SKIN: ICD-10-CM

## 2021-04-20 DIAGNOSIS — Z71.89 OTHER SPECIFIED COUNSELING: ICD-10-CM

## 2021-04-20 DIAGNOSIS — L85.3 XEROSIS CUTIS: ICD-10-CM

## 2021-04-20 DIAGNOSIS — L82.1 OTHER SEBORRHEIC KERATOSIS: ICD-10-CM

## 2021-04-20 DIAGNOSIS — Z87.2 PERSONAL HISTORY OF DISEASES OF THE SKIN AND SUBCUTANEOUS TISSUE: ICD-10-CM

## 2021-04-20 DIAGNOSIS — L81.0 POSTINFLAMMATORY HYPERPIGMENTATION: ICD-10-CM

## 2021-04-20 DIAGNOSIS — L81.4 OTHER MELANIN HYPERPIGMENTATION: ICD-10-CM

## 2021-04-20 PROBLEM — D23.72 OTHER BENIGN NEOPLASM OF SKIN OF LEFT LOWER LIMB, INCLUDING HIP: Status: ACTIVE | Noted: 2021-04-20

## 2021-04-20 PROCEDURE — OTHER COUNSELING: OTHER

## 2021-04-20 PROCEDURE — OTHER PRESCRIPTION: OTHER

## 2021-04-20 PROCEDURE — OTHER MIPS QUALITY: OTHER

## 2021-04-20 PROCEDURE — OTHER REASSURANCE: OTHER

## 2021-04-20 PROCEDURE — OTHER OBSERVATION: OTHER

## 2021-04-20 PROCEDURE — OTHER DIAGNOSIS COMMENT: OTHER

## 2021-04-20 PROCEDURE — 99213 OFFICE O/P EST LOW 20 MIN: CPT

## 2021-04-20 RX ORDER — AMMONIUM LACTATE 12 G/100G
CREAM TOPICAL
Qty: 3 | Refills: 4 | Status: ERX | COMMUNITY
Start: 2021-04-20

## 2021-04-20 ASSESSMENT — LOCATION ZONE DERM
LOCATION ZONE: LEG
LOCATION ZONE: NOSE
LOCATION ZONE: NECK
LOCATION ZONE: ARM
LOCATION ZONE: TRUNK

## 2021-04-20 ASSESSMENT — LOCATION SIMPLE DESCRIPTION DERM
LOCATION SIMPLE: RIGHT WRIST
LOCATION SIMPLE: RIGHT NOSE
LOCATION SIMPLE: LEFT WRIST
LOCATION SIMPLE: LEFT LOWER BACK
LOCATION SIMPLE: CHEST
LOCATION SIMPLE: RIGHT ANTERIOR NECK
LOCATION SIMPLE: LEFT THIGH
LOCATION SIMPLE: RIGHT UPPER BACK
LOCATION SIMPLE: LEFT UPPER BACK

## 2021-04-20 ASSESSMENT — LOCATION DETAILED DESCRIPTION DERM
LOCATION DETAILED: RIGHT MID-UPPER BACK
LOCATION DETAILED: LEFT INFERIOR UPPER BACK
LOCATION DETAILED: RIGHT NASAL ALA
LOCATION DETAILED: LEFT DORSAL WRIST
LOCATION DETAILED: LEFT ANTERIOR PROXIMAL THIGH
LOCATION DETAILED: LEFT MID-UPPER BACK
LOCATION DETAILED: LEFT SUPERIOR LATERAL MIDBACK
LOCATION DETAILED: RIGHT DORSAL WRIST
LOCATION DETAILED: RIGHT CLAVICULAR NECK
LOCATION DETAILED: LEFT LATERAL SUPERIOR CHEST
LOCATION DETAILED: RIGHT INFERIOR UPPER BACK

## 2021-04-20 NOTE — PROCEDURE: MIPS QUALITY
Quality 111:Pneumonia Vaccination Status For Older Adults: Pneumococcal Vaccination Previously Received
Quality 317: Preventative Care And Screening: Screening For High Blood Pressure And Follow-Up Documented: Patient refuses to participate (either BP measurement or follow-up).
Quality 134: Screening For Clinical Depression And Follow-Up Plan: The patient was screened for depression and the screen was negative and no follow up required
Quality 48: Urinary Incontinence: Assessment Of Presence Or Absence Of Urinary Incontinence In Women Aged 65 Years And Older: Presence or absence of urinary incontinence not assessed, reason not otherwise specified
Quality 431: Preventive Care And Screening: Unhealthy Alcohol Use - Screening: Patient screened for unhealthy alcohol use using a single question and scores less than 2 times per year
Quality 154 Part A: Falls: Risk Assessment (Should Be Reported With Measure 155.): Falls risk assessment completed and documented in the past 12 months.
Detail Level: Detailed
Quality 50: Urinary Incontinence: Plan Of Care For Urinary Incontinence In Women Aged 65 Years And Older: Urinary incontinence plan of care not documented, reason not otherwise specified
Quality 154 Part B: Falls: Risk Screening (Should Be Reported With Measure 155.): Patient screened for future fall risk; documentation of no falls in the past year or only one fall without injury in the past year
Quality 155: Falls Plan Of Care: Plan of Care not Documented, Reason not Otherwise Specified
Quality 130: Documentation Of Current Medications In The Medical Record: Current Medications Documented
Quality 226: Preventive Care And Screening: Tobacco Use: Screening And Cessation Intervention: Patient screened for tobacco use and is an ex/non-smoker
Quality 110: Preventive Care And Screening: Influenza Immunization: Influenza Immunization Administered during Influenza season
Quality 47: Advance Care Plan: Advance Care Planning discussed and documented; advance care plan or surrogate decision maker documented in the medical record.
Quality 265: Biopsy Follow-Up: Biopsy results reviewed, communicated, tracked, and documented
Quality 128: Preventive Care And Screening: Body Mass Index (Bmi) Screening And Follow-Up Plan: BMI not documented, reason not otherwise specified.

## 2022-03-08 PROBLEM — Z00.00 ENCOUNTER FOR PREVENTIVE HEALTH EXAMINATION: Status: ACTIVE | Noted: 2022-03-08

## 2022-03-10 ENCOUNTER — APPOINTMENT (OUTPATIENT)
Dept: ORTHOPEDIC SURGERY | Facility: CLINIC | Age: 82
End: 2022-03-10
Payer: MEDICARE

## 2022-03-10 DIAGNOSIS — Z82.49 FAMILY HISTORY OF ISCHEMIC HEART DISEASE AND OTHER DISEASES OF THE CIRCULATORY SYSTEM: ICD-10-CM

## 2022-03-10 DIAGNOSIS — Z86.79 PERSONAL HISTORY OF OTHER DISEASES OF THE CIRCULATORY SYSTEM: ICD-10-CM

## 2022-03-10 DIAGNOSIS — Z82.3 FAMILY HISTORY OF STROKE: ICD-10-CM

## 2022-03-10 DIAGNOSIS — Z87.19 PERSONAL HISTORY OF OTHER DISEASES OF THE DIGESTIVE SYSTEM: ICD-10-CM

## 2022-03-10 DIAGNOSIS — Z86.2 PERSONAL HISTORY OF DISEASES OF THE BLOOD AND BLOOD-FORMING ORGANS AND CERTAIN DISORDERS INVOLVING THE IMMUNE MECHANISM: ICD-10-CM

## 2022-03-10 DIAGNOSIS — Z78.0 ASYMPTOMATIC MENOPAUSAL STATE: ICD-10-CM

## 2022-03-10 DIAGNOSIS — Z86.69 PERSONAL HISTORY OF OTHER DISEASES OF THE NERVOUS SYSTEM AND SENSE ORGANS: ICD-10-CM

## 2022-03-10 DIAGNOSIS — Z85.828 PERSONAL HISTORY OF OTHER MALIGNANT NEOPLASM OF SKIN: ICD-10-CM

## 2022-03-10 DIAGNOSIS — Z83.511 FAMILY HISTORY OF GLAUCOMA: ICD-10-CM

## 2022-03-10 DIAGNOSIS — Z83.3 FAMILY HISTORY OF DIABETES MELLITUS: ICD-10-CM

## 2022-03-10 DIAGNOSIS — Z83.79 FAMILY HISTORY OF OTHER DISEASES OF THE DIGESTIVE SYSTEM: ICD-10-CM

## 2022-03-10 PROCEDURE — 99204 OFFICE O/P NEW MOD 45 MIN: CPT | Mod: 25

## 2022-03-10 PROCEDURE — 20550 NJX 1 TENDON SHEATH/LIGAMENT: CPT | Mod: F7,F8

## 2022-03-10 PROCEDURE — 73130 X-RAY EXAM OF HAND: CPT | Mod: 50

## 2022-03-10 RX ORDER — CHOLECALCIFEROL (VITAMIN D3) 1250 MCG
1.25 MG CAPSULE ORAL
Refills: 0 | Status: ACTIVE | COMMUNITY

## 2022-03-10 RX ADMIN — Medication %: at 00:00

## 2022-03-10 RX ADMIN — Medication MG/ML: at 00:00

## 2022-03-10 NOTE — ADDENDUM
[FreeTextEntry1] : I, Rolan Jennings, acted solely as a scribe for Dr. Payan on this date on 03/10/2022.

## 2022-03-10 NOTE — HISTORY OF PRESENT ILLNESS
[Right] : right hand dominant [FreeTextEntry1] : She comes in today for evaluation of bilateral hand pain, which began one month ago with no injury. She notes swelling both hands. She states that her finger locks when she  tightly. She denies having imaging and testing done, recently.\par \par She has a history of trigger fingers, which were treated with cortisone injections 5 years ago in [place].  She also states that she has a history of bilateral carpal tunnel syndrome and had cortisone injections in the past of both carpal tunnels with resolution of her symptoms.\par \par She was referred by Dr. Britt.

## 2022-03-10 NOTE — CONSULT LETTER
[Dear  ___] : Dear  [unfilled], [Consult Letter:] : I had the pleasure of evaluating your patient, [unfilled]. [Please see my note below.] : Please see my note below. [Consult Closing:] : Thank you very much for allowing me to participate in the care of this patient.  If you have any questions, please do not hesitate to contact me. [Sincerely,] : Sincerely, [FreeTextEntry3] : Vega Payan M.D.\par Surgery of the Hand & Upper Extremity\par Orthopaedic Surgery\par Chief, Hand Service, [facility]\par Director, Hand Service, [facility]\par  of Orthopedic Surgery, [facility] \par [facility]Email: [email or web address]\par Office Phone: [phone]\par

## 2022-03-10 NOTE — PHYSICAL EXAM
[de-identified] : - Constitutional: This is a female in no obvious distress.  \par - Psych: Patient is alert and oriented to person, place and time.  Patient has a normal mood and affect.\par - Cardiovascular: Normal pulses throughout the upper extremities.  No significant varicosities are noted in the upper extremities. \par - Neuro: Strength and sensation are intact throughout the upper extremities.  Patient has normal coordination.\par - Respiratory:  Patient exhibits no evidence of shortness of breath or difficulty breathing.\par - Skin: No rashes, lesions, or other abnormalities are noted in the upper extremities.\par \par ---\par \par Examination of her hands demonstrates arthritis at the DIP joints of the digits, most notably at the left ring finger.  There is some tenderness.  \par \par Examination of her right hand demonstrates swelling and tenderness along the A1 pulley of the middle finger with triggering and locking.  There is also some tenderness along the right ring finger A1 pulley without triggering.  There is no triggering of the other digits.  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  There is no triggering of the thumb.\par \par Examination of her left hand demonstrates mild tenderness along the DIP joint of the left ring finger.  She is quite tender along the A1 pulley of the ring finger with possibly mild triggering.  She also has some tenderness along the A1 pulleys of the left index and middle fingers with mild triggering of the middle finger.\par \par Provocative signs for carpal tunnel syndrome are negative bilaterally.  She is neurologic intact distally bilaterally along the radial, ulnar and median nerve distributions. [de-identified] : AP, lateral, and oblique radiographs of the bilateral hands demonstrate arthritis of the DIP joints of the digits, most notably at the left ring finger and right index finger.  There is mild narrowing of the CMC joints bilaterally.

## 2022-03-10 NOTE — END OF VISIT
[FreeTextEntry3] : This note was written by Rolan Jennings on 03/10/2022 acting solely as a scribe for Dr. Vega Payan.\par  \par All medical record entries made by the Scribe were at my, Dr. Vega Payan, direction and personally dictated by me on 03/10/2022. I have personally reviewed the chart and agree that the record accurately reflects my personal performance of the history, physical exam.

## 2022-03-10 NOTE — DISCUSSION/SUMMARY
[FreeTextEntry1] : She has findings consistent with bilateral hand pain primarily secondary to a right middle finger trigger finger and a left ring finger trigger finger/flexor tendinitis.  She does have arthritis at the DIP joints as well.\par \par I had a discussion with the patient regarding today's visit, the prognosis of this diagnosis, and treatment recommendations and options. At this time, I recommended cortisone injection into the left ring finger and right middle finger A1 pulleys.\par \par The patient has agreed to the above plan of management and has expressed full understanding.  All questions were fully answered to the patient's satisfaction. \par \par My cumulative time spent on this visit included: Preparation for the visit, review of the medical records, review of pertinent diagnostic studies, examination and counseling of the patient on the above diagnosis, treatment plan and prognosis, orders of diagnostic tests, medication and/or appropriate procedures and documentation in the medical records of today's visit.

## 2022-03-23 ENCOUNTER — TRANSCRIPTION ENCOUNTER (OUTPATIENT)
Age: 82
End: 2022-03-23

## 2022-04-07 ENCOUNTER — APPOINTMENT (OUTPATIENT)
Dept: ORTHOPEDIC SURGERY | Facility: CLINIC | Age: 82
End: 2022-04-07
Payer: MEDICARE

## 2022-04-07 PROCEDURE — 29130 APPL FINGER SPLINT STATIC: CPT | Mod: F2,F7

## 2022-04-07 PROCEDURE — 99214 OFFICE O/P EST MOD 30 MIN: CPT | Mod: 25

## 2022-04-07 NOTE — HISTORY OF PRESENT ILLNESS
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  She was given a cortisone injection at her right middle finger trigger finger and her left ring finger trigger finger.\par \par She states that she did well for a week. She denies pain. She note triggering in both ring finger.\par \par She has a history of trigger fingers, which were treated with cortisone injections 5 years ago in [place].  She also states that she has a history of bilateral carpal tunnel syndrome and had cortisone injections in the past of both carpal tunnels with resolution of her symptoms.

## 2022-04-07 NOTE — DISCUSSION/SUMMARY
[FreeTextEntry1] : I had a discussion regarding today's visit, the diagnosis and treatment recommendations and options.  We also discussed changes since the last visit.  At this time, we discussed option of a cortisone injection at the right middle finger trigger finger and an injection at the left middle finger trigger finger.  She has deferred the injections today, as she is concerned about too much cortisone.  She states that the finger to lock at night.  She was therefore provided with bilateral finger splints to wear at night.  In addition, as she has symptoms consistent with bilateral carpal tunnel syndrome with numbness and tingling in night which has recently recurred, I recommended continued use of the carpal tunnel splints at night.  She will follow-up in 4 weeks to assess her progress.\par \par The patient has agreed to the above plan of management and has expressed full understanding.  All questions were fully answered to the patient's satisfaction.\par \par My cumulative time spent on today's visit was greater than 30 minutes and included: Preparation for the visit, review of the medical records, review of pertinent diagnostic studies, examination and counseling of the patient on the above diagnosis, treatment plan and prognosis, orders of diagnostic tests, medications and/or appropriate procedures and documentation in the medical records of today's visit.

## 2022-04-07 NOTE — ADDENDUM
[FreeTextEntry1] : I, Rolan Jennings, acted solely as a scribe for Dr. Payan on this date on 04/07/2022.

## 2022-04-07 NOTE — END OF VISIT
[FreeTextEntry3] : This note was written by Rolan Jennings on 04/07/2022 acting solely as a scribe for Dr. Vega Payan.\par  \par All medical record entries made by the Scribe were at my, Dr. Vega Payan, direction and personally dictated by me on 04/07/2022. I have personally reviewed the chart and agree that the record accurately reflects my personal performance of the history, physical exam.

## 2022-04-07 NOTE — PHYSICAL EXAM
[de-identified] : - Constitutional: This is a female in no obvious distress.  \par - Psych: Patient is alert and oriented to person, place and time.  Patient has a normal mood and affect.\par - Cardiovascular: Normal pulses throughout the upper extremities.  No significant varicosities are noted in the upper extremities. \par - Neuro: Strength and sensation are intact throughout the upper extremities.  Patient has normal coordination.\par - Respiratory:  Patient exhibits no evidence of shortness of breath or difficulty breathing.\par - Skin: No rashes, lesions, or other abnormalities are noted in the upper extremities.\par \par ---\par \par Examination of her hands demonstrates arthritis at the DIP joints of the digits, most notably at the left ring finger.  There is some tenderness.  \par \par Examination of her right hand demonstrates decreased swelling and tenderness along the A1 pulley of the middle finger.  There is residual triggering and locking.  There is also some tenderness along the right ring finger A1 pulley without triggering.  There is no triggering of the other digits.  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  There is no triggering of the thumb.\par \par Examination of her left hand demonstrates mild tenderness along the DIP joint of the left ring finger.  She is no longer tender along the A1 pulley of the ring finger and there is no residual triggering of the ring finger.  She has obvious triggering today of the middle finger with mild tenderness without swelling along the A1 pulley of the middle finger.  There is no triggering of the other digits.\par \par Provocative signs for carpal tunnel syndrome are negative bilaterally.  She is neurologic intact distally bilaterally along the radial, ulnar and median nerve distributions. [de-identified] : AP, lateral, and oblique radiographs of the bilateral hands demonstrate arthritis of the DIP joints of the digits, most notably at the left ring finger and right index finger.  There is mild narrowing of the CMC joints bilaterally.

## 2022-06-28 ENCOUNTER — APPOINTMENT (OUTPATIENT)
Dept: UROLOGY | Facility: CLINIC | Age: 82
End: 2022-06-28

## 2022-06-28 VITALS
WEIGHT: 160 LBS | SYSTOLIC BLOOD PRESSURE: 116 MMHG | HEIGHT: 60 IN | TEMPERATURE: 98.3 F | DIASTOLIC BLOOD PRESSURE: 71 MMHG | HEART RATE: 111 BPM | BODY MASS INDEX: 31.41 KG/M2

## 2022-06-28 DIAGNOSIS — R82.89 OTHER ABNRM FNDNGS ON CYTOLOGICAL: ICD-10-CM

## 2022-06-28 PROCEDURE — 99203 OFFICE O/P NEW LOW 30 MIN: CPT

## 2022-06-28 NOTE — LETTER BODY
[Dear  ___] : Dear  [unfilled], [Consult Letter:] : I had the pleasure of evaluating your patient, [unfilled]. [Please see my note below.] : Please see my note below. [Consult Closing:] : Thank you very much for allowing me to participate in the care of this patient.  If you have any questions, please do not hesitate to contact me. [Sincerely,] : Sincerely, [FreeTextEntry3] : Anshul Max, \par Genitourinary Medicine\par [facility]\par

## 2022-06-28 NOTE — ASSESSMENT
[FreeTextEntry1] : abnormal urine cytology:\par degenerative squamous cells not concerning without presence of hematuria. \par will repeat UA and cytology\par will call patient with results.\par

## 2022-06-28 NOTE — HISTORY OF PRESENT ILLNESS
[FreeTextEntry1] : 82yo F referred for findings in urine cytology\par Cytology didn't find any high grade urothelial carcinoma. Showed de-generative changes to squamous cells. \par She occasionally gets UTI. Reports had urine odor last month which has now resolved. Currently asymptomatic. \par Denies any dysuria or gross hematuria. \par

## 2022-06-29 LAB
APPEARANCE: CLEAR
BACTERIA: NEGATIVE
BILIRUBIN URINE: NEGATIVE
BLOOD URINE: NEGATIVE
COLOR: NORMAL
GLUCOSE QUALITATIVE U: NEGATIVE
HYALINE CASTS: 0 /LPF
KETONES URINE: NEGATIVE
LEUKOCYTE ESTERASE URINE: NEGATIVE
MICROSCOPIC-UA: NORMAL
NITRITE URINE: NEGATIVE
PH URINE: 6
PROTEIN URINE: NEGATIVE
RED BLOOD CELLS URINE: 0 /HPF
SPECIFIC GRAVITY URINE: 1.01
SQUAMOUS EPITHELIAL CELLS: 1 /HPF
URINE CYTOLOGY: NORMAL
UROBILINOGEN URINE: NORMAL
WHITE BLOOD CELLS URINE: 0 /HPF

## 2022-07-28 ENCOUNTER — APPOINTMENT (OUTPATIENT)
Dept: ORTHOPEDIC SURGERY | Facility: CLINIC | Age: 82
End: 2022-07-28

## 2022-07-28 VITALS — HEIGHT: 60 IN | WEIGHT: 160 LBS | BODY MASS INDEX: 31.41 KG/M2

## 2022-07-28 PROCEDURE — 20550 NJX 1 TENDON SHEATH/LIGAMENT: CPT | Mod: F7

## 2022-07-28 PROCEDURE — 99214 OFFICE O/P EST MOD 30 MIN: CPT | Mod: 25

## 2022-07-28 RX ORDER — LIFITEGRAST 50 MG/ML
5 SOLUTION/ DROPS OPHTHALMIC
Qty: 60 | Refills: 0 | Status: ACTIVE | COMMUNITY
Start: 2022-07-22

## 2022-07-28 RX ADMIN — Medication %: at 00:00

## 2022-07-28 RX ADMIN — Medication MG/ML: at 00:00

## 2022-07-28 NOTE — PHYSICAL EXAM
[de-identified] : - Constitutional: This is a female in no obvious distress.  \par - Psych: Patient is alert and oriented to person, place and time.  Patient has a normal mood and affect.\par - Cardiovascular: Normal pulses throughout the upper extremities.  No significant varicosities are noted in the upper extremities. \par - Neuro: Strength and sensation are intact throughout the upper extremities.  Patient has normal coordination.\par - Respiratory:  Patient exhibits no evidence of shortness of breath or difficulty breathing.\par - Skin: No rashes, lesions, or other abnormalities are noted in the upper extremities.\par \par ---\par \par Examination of her hands demonstrates arthritis at the DIP joints of the digits, most notably at the left ring finger.  There is some tenderness.  \par \par Examination of her right hand demonstrates swelling and tenderness along the A1 pulley of the middle finger.  There is no triggering, as she cannot flex to the point of triggering.  There is no swelling or tenderness along the A1 pulley of the ring finger or other digits.  There is no triggering of the other digits. \par \par Examination of her left hand demonstrates mild triggering of the left middle and ring fingers without tenderness or swelling along the A1 pulleys.  She states that these do not particularly bother her.  There is no triggering of the other digits.\par \par Provocative signs for carpal tunnel syndrome are negative bilaterally.  She is neurologic intact distally bilaterally along the radial, ulnar and median nerve distributions. [de-identified] : AP, lateral, and oblique radiographs of the bilateral hands demonstrate arthritis of the DIP joints of the digits, most notably at the left ring finger and right index finger.  There is mild narrowing of the CMC joints bilaterally.

## 2022-07-28 NOTE — HISTORY OF PRESENT ILLNESS
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  She was last seen in the office 3 1/2 months ago and she deferred repeat cortisone injections.\par \par She returns today with pain in her right ring and middle finger. She rates her pain in these finger a 10 out of 10. She had cortisone injections, which helped. She states that she wants a repeat injection. She states that she has difficulty bending middle and ring finger She also complained of left middle finger trigger finger. She states that her left hand is 90 percent improved and the cortisone helped.\par \par She has a history of trigger fingers, which were treated with cortisone injections 5 years ago in [place].  She also states that she has a history of bilateral carpal tunnel syndrome and had cortisone injections in the past of both carpal tunnels with resolution of her symptoms.

## 2022-07-28 NOTE — DISCUSSION/SUMMARY
[FreeTextEntry1] : I had a discussion regarding today's visit, the diagnosis and treatment recommendations and options.  We also discussed changes since the last visit.  At this time, she agreed to proceed with a repeat cortisone injection at the right middle finger.\par \par The patient has agreed to the above plan of management and has expressed full understanding.  All questions were fully answered to the patient's satisfaction.\par \par My cumulative time spent on today's visit was greater than 30 minutes and included: Preparation for the visit, review of the medical records, review of pertinent diagnostic studies, examination and counseling of the patient on the above diagnosis, treatment plan and prognosis, orders of diagnostic tests, medications and/or appropriate procedures and documentation in the medical records of today's visit.

## 2022-07-28 NOTE — PROCEDURE
[FreeTextEntry1] : -  After a discussion of risks and benefits, the patient agreed to proceed with a cortisone injection.  \par -  Side: Right \par -  Finger: Middle finger trigger finger\par -  Medications: 0.5 cc of 1% Lidocaine and 1 cc of Celestone Soluspan, 6mg/cc, using sterile technique.\par -  Patient tolerated the procedure well, without complications.\par -  Patient was told that the symptoms may worsen for a day or two, and should then begin to improve. \par -  Instructions: Patient was instructed on activity modification for the next several days.\par -  Follow-up: Within 4 weeks to assess response to the injection.

## 2022-08-13 ENCOUNTER — NON-APPOINTMENT (OUTPATIENT)
Age: 82
End: 2022-08-13

## 2022-08-25 ENCOUNTER — APPOINTMENT (OUTPATIENT)
Dept: ORTHOPEDIC SURGERY | Facility: CLINIC | Age: 82
End: 2022-08-25

## 2022-08-25 VITALS — BODY MASS INDEX: 31.41 KG/M2 | HEIGHT: 60 IN | WEIGHT: 160 LBS

## 2022-08-25 PROCEDURE — 99213 OFFICE O/P EST LOW 20 MIN: CPT

## 2022-08-25 NOTE — PHYSICAL EXAM
[de-identified] : - Constitutional: This is a female in no obvious distress.  \par - Psych: Patient is alert and oriented to person, place and time.  Patient has a normal mood and affect.\par - Cardiovascular: Normal pulses throughout the upper extremities.  No significant varicosities are noted in the upper extremities. \par - Neuro: Strength and sensation are intact throughout the upper extremities.  Patient has normal coordination.\par - Respiratory:  Patient exhibits no evidence of shortness of breath or difficulty breathing.\par - Skin: No rashes, lesions, or other abnormalities are noted in the upper extremities.\par \par ---\par \par Examination of her hands demonstrates arthritis at the DIP joints of the digits, most notably at the left ring finger.  There is some tenderness.  \par \par Examination of her right hand demonstrates no further swelling and tenderness along the A1 pulley of the middle finger.  There is minimal residual triggering but no obvious locking there is no triggering of the other digits. \par \par Examination of her left hand demonstrates mild triggering of the left middle and ring fingers without tenderness or swelling along the A1 pulleys.  She states that these do not particularly bother her.  There is no triggering of the other digits.\par \par Provocative signs for carpal tunnel syndrome are negative bilaterally.  She is neurologic intact distally bilaterally along the radial, ulnar and median nerve distributions. [de-identified] : AP, lateral, and oblique radiographs of the bilateral hands demonstrate arthritis of the DIP joints of the digits, most notably at the left ring finger and right index finger.  There is mild narrowing of the CMC joints bilaterally.

## 2022-08-25 NOTE — ADDENDUM
[FreeTextEntry1] : I, Rolan Jennings, acted solely as a scribe for Dr. Payan on this date on 08/25/2022.

## 2022-08-25 NOTE — DISCUSSION/SUMMARY
[FreeTextEntry1] : I had a discussion regarding today's visit, the diagnosis and treatment recommendations and options.  We also discussed changes since the last visit.  At this time, as she is doing well and much improved, I recommended observation.  She will follow-up according to her symptoms in the future.\par \par The patient has agreed to the above plan of management and has expressed full understanding.  All questions were fully answered to the patient's satisfaction.\par \par My cumulative time spent on today's visit included: Preparation for the visit, review of the medical records, review of pertinent diagnostic studies, examination and counseling of the patient on the above diagnosis, treatment plan and prognosis, orders of diagnostic tests, medications and/or appropriate procedures and documentation in the medical records of today's visit.

## 2022-08-25 NOTE — END OF VISIT
[FreeTextEntry3] : This note was written by Rolan Jennings on 08/25/2022 acting solely as a scribe for Dr. Vega Payan.\par  \par All medical record entries made by the Scribe were at my, Dr. Vega Payan, direction and personally dictated by me on 08/25/2022. I have personally reviewed the chart and agree that the record accurately reflects my personal performance of the history, physical exam.

## 2022-08-25 NOTE — HISTORY OF PRESENT ILLNESS
[FreeTextEntry1] : 4 weeks status post right middle finger trigger cortisone injection #2.  She also has a left ring finger trigger finger/flexor tendinitis that was injected once, in addition to DIP joint arthritis of the digits.\par \par She returns today in follow-up\par \par She is doing much better. She notes minimal pain and stiffness.She rates her pain a 1 out of 10 at this time. She states that her left ring finger triggers in the morning only.\par \par She has a history of trigger fingers, which were treated with cortisone injections 5 years ago in [place].  She also states that she has a history of bilateral carpal tunnel syndrome and had cortisone injections in the past of both carpal tunnels with resolution of her symptoms.

## 2023-01-12 PROBLEM — M67.441 GANGLION CYST OF FLEXOR TENDON SHEATH OF FINGER OF RIGHT HAND: Status: ACTIVE | Noted: 2022-03-10

## 2023-01-12 PROBLEM — M65.342 TRIGGER RING FINGER OF LEFT HAND: Status: ACTIVE | Noted: 2022-03-10

## 2023-01-12 PROBLEM — M65.331 TRIGGER MIDDLE FINGER OF RIGHT HAND: Status: ACTIVE | Noted: 2022-03-10

## 2023-01-19 ENCOUNTER — APPOINTMENT (OUTPATIENT)
Dept: ORTHOPEDIC SURGERY | Facility: CLINIC | Age: 83
End: 2023-01-19
Payer: MEDICARE

## 2023-01-19 DIAGNOSIS — M65.342 TRIGGER FINGER, LEFT RING FINGER: ICD-10-CM

## 2023-01-19 DIAGNOSIS — M67.441 GANGLION, RIGHT HAND: ICD-10-CM

## 2023-01-19 DIAGNOSIS — M65.331 TRIGGER FINGER, RIGHT MIDDLE FINGER: ICD-10-CM

## 2023-01-19 PROCEDURE — 99214 OFFICE O/P EST MOD 30 MIN: CPT | Mod: 25

## 2023-01-19 PROCEDURE — 20606 DRAIN/INJ JOINT/BURSA W/US: CPT | Mod: RT

## 2023-01-19 NOTE — PHYSICAL EXAM
[de-identified] : - Constitutional: This is a female in no obvious distress.  \par - Psych: Patient is alert and oriented to person, place and time.  Patient has a normal mood and affect.\par - Cardiovascular: Normal pulses throughout the upper extremities.  No significant varicosities are noted in the upper extremities. \par - Neuro: Strength and sensation are intact throughout the upper extremities.  Patient has normal coordination.\par - Respiratory:  Patient exhibits no evidence of shortness of breath or difficulty breathing.\par - Skin: No rashes, lesions, or other abnormalities are noted in the upper extremities.\par \par ---\par \par Examination of her hands demonstrates arthritis at the DIP joints of the digits, most notably at the left ring finger.  She has tenderness without swelling and tenderness along the A1 pulley of the thumb with triggering.  She also has some swelling along the thumb MCP joint.  There is no further swelling or tenderness along the A1 pulley of the middle finger.  There is mild triggering of the middle finger.  There is no triggering of the other digits.  She has complaints of numbness and tingling at the right hand along the median nerve distribution.  Provocative signs for carpal tunnel syndrome are equivocal. [de-identified] : Previous AP, lateral, and oblique radiographs of the bilateral hands demonstrated arthritis of the DIP joints of the digits, most notably at the left ring finger and right index finger.  There is mild narrowing of the CMC joints bilaterally.

## 2023-01-19 NOTE — HISTORY OF PRESENT ILLNESS
[FreeTextEntry1] : Less than 6 months status post right middle finger trigger cortisone injection #2.  \par \par She has a history of multiple other trigger fingers as well as bilateral carpal tunnel syndrome and DIP joint arthritis of the digits.  She has had carpal tunnel injections in the past by another physician with relief.\par \par She returns today, with complaints of right hand numbness and tingling which has been worsening.  Her right middle finger trigger finger is much improved.  She also has pain and triggering of her right thumb.

## 2023-01-19 NOTE — DISCUSSION/SUMMARY
[FreeTextEntry1] : She has findings consistent with right carpal tunnel syndrome as well as a right trigger thumb.  She is most bothered by the right carpal tunnel syndrome, particularly at night.  Her symptoms secondary to the right trigger thumb are mild.  She is doing well with regard to the right middle finger trigger finger.\par \par I had a discussion regarding today's visit, the diagnosis and treatment recommendations and options.  We also discussed changes since the last visit.  At this time, I recommended a cortisone injection at the right carpal tunnel.  With regard to the right trigger thumb, as it is relatively mild, I recommended observation, as I told her that the carpal tunnel injection may help the thumb as well.  If it does not, then she will return to the office for an injection at the right thumb.\par \par The patient has agreed to the above plan of management and has expressed full understanding.  All questions were fully answered to the patient's satisfaction.\par \par My cumulative time spent on today's visit was greater than 30 minutes and included: Preparation for the visit, review of the medical records, review of pertinent diagnostic studies, examination and counseling of the patient on the above diagnosis, treatment plan and prognosis, orders of diagnostic tests, medications and/or appropriate procedures and documentation in the medical records of today's visit.

## 2023-05-04 ENCOUNTER — APPOINTMENT (OUTPATIENT)
Dept: SURGERY | Facility: CLINIC | Age: 83
End: 2023-05-04
Payer: MEDICARE

## 2023-05-04 VITALS
SYSTOLIC BLOOD PRESSURE: 118 MMHG | WEIGHT: 160 LBS | DIASTOLIC BLOOD PRESSURE: 68 MMHG | BODY MASS INDEX: 31.41 KG/M2 | HEIGHT: 60 IN

## 2023-05-04 DIAGNOSIS — Z85.3 PERSONAL HISTORY OF MALIGNANT NEOPLASM OF BREAST: ICD-10-CM

## 2023-05-04 DIAGNOSIS — Z80.3 FAMILY HISTORY OF MALIGNANT NEOPLASM OF BREAST: ICD-10-CM

## 2023-05-04 DIAGNOSIS — Z17.0 PERSONAL HISTORY OF MALIGNANT NEOPLASM OF BREAST: ICD-10-CM

## 2023-05-04 DIAGNOSIS — Z87.891 PERSONAL HISTORY OF NICOTINE DEPENDENCE: ICD-10-CM

## 2023-05-04 PROCEDURE — 99205 OFFICE O/P NEW HI 60 MIN: CPT

## 2023-05-04 NOTE — REASON FOR VISIT
[Consultation] : a consultation visit [Family Member] : family member [FreeTextEntry1] : New left breast cancer

## 2023-05-04 NOTE — ASSESSMENT
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 Measures 1.7cm in greatest dimension on US. MRI is pending. Palpable axillary adenopathy with benign pathology. \par \par \par We discussed the patient’s new diagnosis of IDC/DCIS and the pathophysiology of the disease process. We reviewed her work-up, imaging and pathology results to date including her hormone receptor status. We discussed the difference between systemic and local treatment and options for each type of control. We discussed the role of a breast surgeon, medical oncologist, and radiation oncologist in the patients’ treatment plan. We discussed the indications of surgery, anti-estrogen therapy, chemotherapy, radiation therapy, and the role of genetic testing.   \par \par We reviewed the surgical options of mastectomy versus breast conservation therapy (BCT).  We discussed the equivalent survival outcomes for patients treated with lumpectomy/radiation or mastectomy. With the information I have tanna, she would be a BCT candidate, however, final recommendations are pending MRI findings. Options for reconstruction were also discussed. We further discussed her axillary status and role of SLNB and ALND in breast cancer. Given her palpable adenopathy, with benign results, I would like to discuss this further with our TB to get a consensus on axillary management, and index of suspicion of the lymph node. Additionally, results of her HER2 receptor will also influence treatment decision making. She is going for MRI today at [facility], and will also have SOZO testing done to obtain a baseline measurement for lymphedema surveillance. \par \par I will see her back after the above are complete, and also after her discussion with medical oncolgy. \par \par \par PLAN\par 1. Follow-up in 2 weeks\par 2. Med onc\par 3. MRI breast\par 4. SOZO\par 5. TB discussion

## 2023-05-04 NOTE — PAST MEDICAL HISTORY
[Postmenopausal] : The patient is postmenopausal [Menarche Age ____] : age at menarche was [unfilled] [Menopause Age____] : age at menopause was [unfilled] [Approximately ___] : the LMP was approximately [unfilled] [Excessive Bleeding] : there was excessive bleeding [Regular Cycle Intervals] : have been regular [Total Preg ___] : G[unfilled] [Live Births ___] : P[unfilled]  [Living ___] : Living: [unfilled] [Age At Live Birth ___] : Age at live birth: [unfilled] [History of Hormone Replacement Treatment] : has no history of hormone replacement treatment [FreeTextEntry5] : Cyst Aspiration (in her 30's)\par Ovarian Cyst (age 53) - One ovary was removed\par Dilation and curettage (D & C) - age 52 [FreeTextEntry2] : 1 Still Born [FreeTextEntry6] : No. [FreeTextEntry7] : Yes, for 6 months. [FreeTextEntry8] : No.

## 2023-05-04 NOTE — PHYSICAL EXAM
[Normocephalic] : normocephalic [Atraumatic] : atraumatic [Supple] : supple [No Supraclavicular Adenopathy] : no supraclavicular adenopathy [No dominant masses] : no dominant masses in right breast  [Examined in the supine and seated position] : examined in the supine and seated position [No dominant masses] : no dominant masses left breast [No Nipple Retraction] : no left nipple retraction [No Nipple Discharge] : no left nipple discharge [No Axillary Lymphadenopathy] : no right axillary lymphadenopathy [No Edema] : no edema [No Rashes] : no rashes [No Ulceration] : no ulceration [Bra Size: ___] : Bra Size: [unfilled] [Grade 2] : Ptosis Grade 2 [de-identified] : Palpable density in the left lower quadrant with overlying ecchymosis. Unclear if this is post-biospy hematoma or tumor.  [de-identified] : Palpable node-round and mobile. Not fixed or matted.

## 2023-05-04 NOTE — HISTORY OF PRESENT ILLNESS
[FreeTextEntry1] : Oncology History:\par 1. Left breast 5:00 4 cm FN, cT1cNx IDC, DCIS, grade 2, ER/AR +, HER2 2+, EDISON pending\par     - Measures 1.2 x 1.5 x 1.7 cm mass on US, Abnormal lymph node with cortical thickening (bx benign)\par \par \par CARE TEAM\par PCP - Dr. Cait Britt\par Med onc-Jessica\par \par \par HPI: \par - Patient is a 82 year old female who presents for an initial consultation 05/04/2023 for newly diagnosed left breast cancer.  She went for routine screening mammogram 4/13/23, which demonstrated a new left breast mass. Diagnostic imaging and biopsy demonstrating a 1.7 cm IDC, grade 2, ER %, AR 31-40%, HER2 2+, EDISON pending, as well as an enlarged left axillary lymph node, biopsy was benign. \par \par BREAST HISTORY: Denies breast symptoms. Had a breast cyst aspirated in her 30s, but otherwise denies prior breast biopsies. No prior breast surgeries. \par \par No AC or aspirin\par NKDA, no latex allergy\par Pmhx-HTN\par Fam hx- Mother had breast cancer at 71, niece had breast cancer at 53 \par \par \par IMAGING:\par 04/13/23 - [facility]: Bilateral Screening Mammogram - Density B. Irregular density seen in the left breast. IMPRESSION: An irregular density in the left breast, as marked. Compression views and additional radiographic and sonographic evaluation as needed is recommended. - BIRADS 0: Incomplete. Needs additional imaging evaluation.\par \par 04/20/23 - [facility]: Left Unilateral Diagnostic Mammogram/US - 5:00 4 cm FN 1.2 x 1.5 x 1.7 cm irregular hypoechoic mass. Left axillary 1.5 cm abnormal lymph node with thickened cortex.  - BIRADS 5: Highly Suggestive for Malignancy.\par \par 04/26/23 - [facility]: Left Breast Ultrasound-Guided Core Needle Biopsy - Q-clips. PATHOLOGY: Left breast mass, 5:00 axis, 1.7 cm (measuring up to 2.0 cm) = Invasive ductal carcinoma, moderately differentiated, and cribriform type ductal carcinoma in situ. Size at least 10 mm. ER/AR +, HER2 EDISON pending. Left axillary lymph node with thickened cortex. Benign lymph node. These findings are concordant with imaging features. However, given the sonographic appearance of the left axillary lymph node, the possibility of metastatic left axillary lymphadenopathy is still considered, which may not have been included in this particular biopsy sample.

## 2023-05-04 NOTE — REVIEW OF SYSTEMS
[Negative] : Heme/Lymph [Breast Lump] : breast lump [Breast Pain] : no breast pain [Nipple Discharge] : no nipple discharge

## 2023-05-04 NOTE — ADDENDUM
[FreeTextEntry1] : This note was written by Carleen Galvez, acting as the  for Dr. Ham. This note accurately reflects the work and decisions made by Dr. Ham.

## 2023-05-16 ENCOUNTER — NON-APPOINTMENT (OUTPATIENT)
Age: 83
End: 2023-05-16

## 2023-05-16 ENCOUNTER — APPOINTMENT (OUTPATIENT)
Dept: OPHTHALMOLOGY | Facility: CLINIC | Age: 83
End: 2023-05-16
Payer: MEDICARE

## 2023-05-16 PROCEDURE — 92004 COMPRE OPH EXAM NEW PT 1/>: CPT

## 2023-05-17 ENCOUNTER — APPOINTMENT (OUTPATIENT)
Dept: SURGERY | Facility: CLINIC | Age: 83
End: 2023-05-17
Payer: MEDICARE

## 2023-05-17 PROCEDURE — 99215 OFFICE O/P EST HI 40 MIN: CPT

## 2023-05-17 NOTE — HISTORY OF PRESENT ILLNESS
[FreeTextEntry1] : Oncology History:\par 1. Left breast 5:00 4 cm FN, cT2N0 IDC, DCIS, grade 2, ER/CA +, HER2 2+, EDISON negative\par     - Measures 1.2 x 1.5 x 1.7 cm mass on US, Abnormal lymph node with cortical thickening (bx benign)\par     -MRI enhancement measures 2.3 x 1.4 x 1.7cm, within 1.1cm from inferior skin surface \par \par CARE TEAM\par PCP - Dr. Cait Britt\par Cards- [facility] \par Med Onc - Jessica\par \par \par INTERVAL HISTORY:\par Patient presents for followup after recent MRI/PET scan for final surgical planning. SOZO was also performed at [facility] at the time of her MRI. \par \par \par HPI: \par  Patient is a 82 year old female who presents for an initial consultation 05/04/2023 for newly diagnosed left breast cancer.  She went for routine screening mammogram 4/13/23, which demonstrated a new left breast mass. Diagnostic imaging and biopsy demonstrating a 1.7 cm IDC, grade 2, ER %, CA 31-40%, HER2 2+, EDISON pending, as well as an enlarged left axillary lymph node, biopsy was benign. \par \par \par BREAST HISTORY: Denies breast symptoms. Had a breast cyst aspirated in her 30s, but otherwise denies prior breast biopsies. No prior breast surgeries. \par \par No AC or aspirin\par NKDA, no latex allergy\par Pmhx-HTN\par Fam hx- Mother had breast cancer at 71, niece had breast cancer at 53 \par \par \par IMAGING:\par 04/13/23 - [facility]: Bilateral Screening Mammogram - Density B. Irregular density seen in the left breast. IMPRESSION: An irregular density in the left breast, as marked. Compression views and additional radiographic and sonographic evaluation as needed is recommended. - BIRADS 0: Incomplete. Needs additional imaging evaluation.\par \par 04/20/23 - [facility]: Left Unilateral Diagnostic Mammogram - FINDINGS: Lower outer quadrant spiculated mass persists. IMPRESSION: Spiculated mass with sonographic correlate. - BIRADS 5: Highly Suggestive for Malignancy.\par \par 04/20/23 - [facility]: Complete Left Breast Ultrasound - IMPRESSION: Left breast 5:00 irregular hypoechoic mass corresponding to the mammogram. Left axillary abnormal lymph node. - BIRADS 5: Highly Suggestive for Malignancy.\par \par 04/26/23 - [facility]: Left Breast Ultrasound-Guided Core Needle Biopsy - Q-clips. PATHOLOGY: Left breast mass, 5:00 axis, 1.7 cm (measuring up to 2.0 cm) = Invasive ductal carcinoma, moderately differentiated, and cribriform type ductal carcinoma in situ. Size at least 10 mm. ER/CA +, HER2 EDISON pending. Left axillary lymph node with thickened cortex. Benign lymph node. These findings are concordant with imaging features. However, given the sonographic appearance of the left axillary lymph node, the possibility of metastatic left axillary lymphadenopathy is still considered, which may not have been included in this particular biopsy sample.\par \par 05/04/23 - [facility]: Contrast Enhanced Bilateral Breast MRI - Left slightly lower outer breast is an irregular enhancing mass with spiculated margins corresponding to the biopsy-proven IDC. Mass measures 2.3 x 1.4 x 1.7cm. Abnormal enhancement extends up to 1.1cm from the inferior skin surface, and 1.6cm from the lateral skin surface. IMPRESSION: Biopsy-proven lower slightly outer left breast malignancy measuring up to 2.3 cm marked by Q clip. Recommend continued surgical/oncological management. Multiple (greater than 3) highly prominent left axillary lymph nodes including a level 2 axillary lymph node measuring up to 0.6 cm in cortical thickness. This is of uncertain significance given benign biopsy result of a previously biopsied level 1 axillary lymph node. A 0.6 cm intramammary chain lymph node. No evidence of malignancy in he right breast. - BIRADS 6: Known Biopsy-Proven Malignancy.\par \par 05/08/23 - [facility]: PET CT (Skull Base to Mid Thigh) for Breast Cancer - IMPRESSION: (1) Hypermetabolic outer left breast mass containing biopsy clip consistent with malignancy. (2) Mild FDG avid left axillary node containing biopsy clip showed benign lymph node. Up to 0.6 cm left internal mammary node without significant radiotracer activity. Attention to follow-up. (3) No evidence of abnormal radiotracer activity in the remaining field of view.

## 2023-05-17 NOTE — REVIEW OF SYSTEMS
[Breast Lump] : breast lump [Negative] : Heme/Lymph [Breast Pain] : no breast pain [Nipple Discharge] : no nipple discharge

## 2023-05-17 NOTE — ASSESSMENT
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 Measures 1.7 cm in greatest dimension on US. MRI measures 2.3cm in greatest dimension. Palpable axillary adenopathy with benign pathology and normal PET scan. \par \par MRI, PET scan, and BCC discussion reviewed. I explained to her that the size of her cancer is slightly larger on MRI than what was demonstrated on MMG/US. I do believe she will still be a BCT, however, proximity of the lesion to the skin was also discussed. Given these findings, I did recommend  J-mammoplasty, which would allow us to remove some of the overlying skin with the lumpectomy, which I also believe would provide her with a better cosmetic result. Additionally, benign findings of her PET scan was discussed, and her lymph nodes were felt to be symmetrically enlarged, and benign biopsy results, concordant. SLNB with dual tracer was also discussed, with associated risk of lymphedema. \par \par We discussed the same day nature of the procedure, post-operative expectations/healing, need for localization prior to surgery, and PST/medical clearance. Cardiology clearance was also recommended, but I will defer this to her primary physician. I will go over the final pathology results with her at her post-op visit 7-14 days later.\par \par Risks of the procedure were discussed including bleeding, infection, seroma, need for re-excision or completion axillary dissection, lymphedema, scarring, pain, asymmetry, or cosmetic deformity.\par \par Patient verbalized understanding for all treatment plans discussed. All of her questions were answered to the best of my ability. She was encouraged to call the office if any questions or concerns or come in sooner if needed.\par \par \par \par PLAN\par 1. Left kelsey partial mastectomy, J-mammoplasty, SLNB\par 2. PST/med clearance +/- cards clearance\par 3. Followup post-op\par

## 2023-05-17 NOTE — PHYSICAL EXAM
[Normocephalic] : normocephalic [Atraumatic] : atraumatic [Supple] : supple [No Supraclavicular Adenopathy] : no supraclavicular adenopathy [Examined in the supine and seated position] : examined in the supine and seated position [Bra Size: ___] : Bra Size: [unfilled] [Grade 2] : Ptosis Grade 2 [No dominant masses] : no dominant masses in right breast  [No dominant masses] : no dominant masses left breast [No Nipple Retraction] : no left nipple retraction [No Nipple Discharge] : no left nipple discharge [No Edema] : no edema [No Rashes] : no rashes [No Ulceration] : no ulceration [No Axillary Lymphadenopathy] : no left axillary lymphadenopathy [de-identified] : Deferred- below findings represent PE from initial consulation  [de-identified] : Palpable density in the left lower quadrant with overlying ecchymosis. Unclear if this is post-biopsy hematoma or tumor.  [de-identified] : Palpable node-round and mobile. Not fixed or matted.

## 2023-06-02 ENCOUNTER — APPOINTMENT (OUTPATIENT)
Dept: SURGERY | Facility: AMBULATORY MEDICAL SERVICES | Age: 83
End: 2023-06-02
Payer: MEDICARE

## 2023-06-02 ENCOUNTER — RESULT REVIEW (OUTPATIENT)
Age: 83
End: 2023-06-02

## 2023-06-02 PROCEDURE — 14001 TIS TRNFR TRUNK 10.1-30SQCM: CPT | Mod: LT

## 2023-06-02 PROCEDURE — 38525 BIOPSY/REMOVAL LYMPH NODES: CPT | Mod: LT

## 2023-06-02 PROCEDURE — 38792 RA TRACER ID OF SENTINL NODE: CPT | Mod: LT,59

## 2023-06-02 PROCEDURE — 38900 IO MAP OF SENT LYMPH NODE: CPT | Mod: LT

## 2023-06-02 PROCEDURE — 19301 PARTIAL MASTECTOMY: CPT | Mod: LT

## 2023-06-13 ENCOUNTER — APPOINTMENT (OUTPATIENT)
Dept: SURGERY | Facility: CLINIC | Age: 83
End: 2023-06-13
Payer: MEDICARE

## 2023-06-13 VITALS
SYSTOLIC BLOOD PRESSURE: 116 MMHG | HEIGHT: 60 IN | BODY MASS INDEX: 31.41 KG/M2 | DIASTOLIC BLOOD PRESSURE: 72 MMHG | WEIGHT: 160 LBS

## 2023-06-13 PROCEDURE — 99024 POSTOP FOLLOW-UP VISIT: CPT

## 2023-06-13 NOTE — PHYSICAL EXAM
[Normocephalic] : normocephalic [Atraumatic] : atraumatic [Supple] : supple [No Supraclavicular Adenopathy] : no supraclavicular adenopathy [Examined in the supine and seated position] : examined in the supine and seated position [Bra Size: ___] : Bra Size: [unfilled] [Grade 2] : Ptosis Grade 2 [No dominant masses] : no dominant masses in right breast  [No dominant masses] : no dominant masses left breast [No Nipple Retraction] : no left nipple retraction [No Nipple Discharge] : no left nipple discharge [No Edema] : no edema [No Ulceration] : no ulceration [No Rashes] : no rashes [EOMI] : extra ocular movement intact [PERRL] : pupils equal, round and reactive to light [Sclera nonicteric] : sclera nonicteric [de-identified] : Steri-Strips noted around breast incision, left axillae incision healing well, C/D/I, there is palpable swelling consistent with seroma but without overlying skin compromise, no erythema or drainage

## 2023-06-13 NOTE — HISTORY OF PRESENT ILLNESS
[FreeTextEntry1] : Oncology History:\par 1. Left breast 5:00 4 cm FN, cT2N0---> pT2N1a, IDC, DCIS, grade 2, ER/CT +, HER2 2+, EDISON negative\par     - Measures 1.2 x 1.5 x 1.7 cm mass on US, Abnormal lymph node with cortical thickening (bx benign)\par     -MRI enhancement measures 2.3 x 1.4 x 1.7cm, within 1.1cm from inferior skin surface \par     -left kelsey partial mastectomy, J-mammoplasty, SLNB (06/02/23) \par FINAL PATH: Invasive carcinoma, 22mm, 1/2 lymph nodes positive for metastatic carcinoma, DCIS posterior margin 2mm from new margin \par \par CARE TEAM\par PCP - Dr. Cait Britt\par [facility] \par Med Onc - Jessica\par Rad onc-Jose\par \par \par INTERVAL HISTORY:\par (05/17/23) Patient presents for followup after recent MRI/PET scan for final surgical planning. SOZO was also performed at [facility] at the time of her MRI. \par \par (06/12/23) : Patient presenting for post op visit, complaining of swelling in the left axilla. Has an appt for followup w/ Dr. Lopez 6/23 and Dr. Garcia 6/27.\par \par \par HPI: \par  Patient is a 82 year old female who presents for an initial consultation 05/04/2023 for newly diagnosed left breast cancer.  She went for routine screening mammogram 4/13/23, which demonstrated a new left breast mass. Diagnostic imaging and biopsy demonstrating a 1.7 cm IDC, grade 2, ER %, CT 31-40%, HER2 2+, EDISON pending, as well as an enlarged left axillary lymph node, biopsy was benign. \par \par \par BREAST HISTORY: Denies breast symptoms. Had a breast cyst aspirated in her 30s, but otherwise denies prior breast biopsies. No prior breast surgeries. \par \par No AC or aspirin\par NKDA, no latex allergy\par Pmhx-HTN\par Fam hx- Mother had breast cancer at 71, niece had breast cancer at 53 \par \par \par IMAGING:\par 04/13/23 - [facility]: Bilateral Screening Mammogram - Density B. Irregular density seen in the left breast. IMPRESSION: An irregular density in the left breast, as marked. Compression views and additional radiographic and sonographic evaluation as needed is recommended. - BIRADS 0: Incomplete. Needs additional imaging evaluation.\par \par 04/20/23 - [facility]: Left Unilateral Diagnostic Mammogram - FINDINGS: Lower outer quadrant spiculated mass persists. IMPRESSION: Spiculated mass with sonographic correlate. - BIRADS 5: Highly Suggestive for Malignancy.\par \par 04/20/23 - [facility]: Complete Left Breast Ultrasound - IMPRESSION: Left breast 5:00 irregular hypoechoic mass corresponding to the mammogram. Left axillary abnormal lymph node. - BIRADS 5: Highly Suggestive for Malignancy.\par \par 04/26/23 - [facility]: Left Breast Ultrasound-Guided Core Needle Biopsy - Q-clips. PATHOLOGY: Left breast mass, 5:00 axis, 1.7 cm (measuring up to 2.0 cm) = Invasive ductal carcinoma, moderately differentiated, and cribriform type ductal carcinoma in situ. Size at least 10 mm. ER/CT +, HER2 EDISON pending. Left axillary lymph node with thickened cortex. Benign lymph node. These findings are concordant with imaging features. However, given the sonographic appearance of the left axillary lymph node, the possibility of metastatic left axillary lymphadenopathy is still considered, which may not have been included in this particular biopsy sample.\par \par 05/04/23 - [facility]: Contrast Enhanced Bilateral Breast MRI - Left slightly lower outer breast is an irregular enhancing mass with spiculated margins corresponding to the biopsy-proven IDC. Mass measures 2.3 x 1.4 x 1.7cm. Abnormal enhancement extends up to 1.1cm from the inferior skin surface, and 1.6cm from the lateral skin surface. IMPRESSION: Biopsy-proven lower slightly outer left breast malignancy measuring up to 2.3 cm marked by Q clip. Recommend continued surgical/oncological management. Multiple (greater than 3) highly prominent left axillary lymph nodes including a level 2 axillary lymph node measuring up to 0.6 cm in cortical thickness. This is of uncertain significance given benign biopsy result of a previously biopsied level 1 axillary lymph node. A 0.6 cm intramammary chain lymph node. No evidence of malignancy in he right breast. - BIRADS 6: Known Biopsy-Proven Malignancy.\par \par 05/08/23 - [facility]: PET CT (Skull Base to Mid Thigh) for Breast Cancer - IMPRESSION: (1) Hypermetabolic outer left breast mass containing biopsy clip consistent with malignancy. (2) Mild FDG avid left axillary node containing biopsy clip showed benign lymph node. Up to 0.6 cm left internal mammary node without significant radiotracer activity. Attention to follow-up. (3) No evidence of abnormal radiotracer activity in the remaining field of view.

## 2023-06-13 NOTE — PROCEDURE
[FreeTextEntry1] : US-guided seroma aspiration  [FreeTextEntry2] : Seroma left axillae  [FreeTextEntry3] : There area was cleansed with ChloraPrep. Under us-guidance a 22G needle was utilized to aspirate the seroma content, Roughly 50 CC of serous fluid was aspirated and discarded. US at the end of the procedure demonstrating near resolution of seroma

## 2023-06-20 ENCOUNTER — APPOINTMENT (OUTPATIENT)
Dept: SURGERY | Facility: CLINIC | Age: 83
End: 2023-06-20
Payer: MEDICARE

## 2023-06-20 VITALS
SYSTOLIC BLOOD PRESSURE: 118 MMHG | DIASTOLIC BLOOD PRESSURE: 78 MMHG | BODY MASS INDEX: 31.41 KG/M2 | HEIGHT: 60 IN | WEIGHT: 160 LBS

## 2023-06-20 DIAGNOSIS — N64.89 OTHER SPECIFIED DISORDERS OF BREAST: ICD-10-CM

## 2023-06-20 PROCEDURE — 99024 POSTOP FOLLOW-UP VISIT: CPT

## 2023-06-20 RX ORDER — CEPHALEXIN 500 MG/1
500 TABLET ORAL
Qty: 10 | Refills: 0 | Status: ACTIVE | COMMUNITY
Start: 2023-06-20 | End: 1900-01-01

## 2023-06-20 NOTE — PHYSICAL EXAM
[Normocephalic] : normocephalic [Atraumatic] : atraumatic [EOMI] : extra ocular movement intact [PERRL] : pupils equal, round and reactive to light [Sclera nonicteric] : sclera nonicteric [Supple] : supple [No Supraclavicular Adenopathy] : no supraclavicular adenopathy [Examined in the supine and seated position] : examined in the supine and seated position [Bra Size: ___] : Bra Size: [unfilled] [Grade 2] : Ptosis Grade 2 [No dominant masses] : no dominant masses in right breast  [No dominant masses] : no dominant masses left breast [No Nipple Retraction] : no left nipple retraction [No Nipple Discharge] : no left nipple discharge [No Edema] : no edema [No Rashes] : no rashes [No Ulceration] : no ulceration [de-identified] :  left axillae incision healing well, C/D/I, there is palpable swelling consistent with seroma but without overlying skin compromise, no erythema or drainage

## 2023-06-20 NOTE — ASSESSMENT
[FreeTextEntry1] : 82 year old female with Left breast 5:00 4 cm FN, IDC/DCIS, grade 2, ER/ND +, HER2 2+, EDISON pending. Measures 1.7 cm in greatest dimension on US. MRI measures 2.3cm in greatest dimension. Palpable axillary adenopathy with benign pathology and  PET scan without evidence of distant disease. Left kelsey partial mastectomy, J-mammoplasty, SLNB on 06/02/2023. \par \par  There is a palpable seroma in the left axilla and we discussed the option of aspiration as well as risk vs benefit, she would prefer to have the seroma aspirated at this time. Aspiration preformed with ultrasound roughly 150 cc of serous fluid aspirated and discarded. This is the 2nd aspiration of a seroma, explained to patient to callback if swelling returns. Patient may need possible drain placed by IR if seromas recurrent.  Will start patient on prophylactic antibiotic to prevent infection. \par \par Patient to follow up with radiation oncology and medical oncology. \par \par Patient verbalized understanding of all treatment plans. All of her questions were answered to the best of my ability. She was encouraged to call the office for any questions or concerns sooner \par \par \par Plan:\par 1. Followup med onc\par 2. Followup rad onc\par 3. Follow up in 6 months or sooner if needed \par 4. Keflex prophylaxis \par \par

## 2023-06-20 NOTE — HISTORY OF PRESENT ILLNESS
[FreeTextEntry1] : Oncology History:\par 1. Left breast 5:00 4 cm FN, cT2N0---> pT2N1a, IDC, DCIS, grade 2, ER/MA +, HER2 2+, EDISON negative\par     - Measures 1.2 x 1.5 x 1.7 cm mass on US, Abnormal lymph node with cortical thickening (bx benign)\par     -MRI enhancement measures 2.3 x 1.4 x 1.7cm, within 1.1cm from inferior skin surface \par     -left kelsey partial mastectomy, J-mammoplasty, SLNB (06/02/23) \par FINAL PATH: Invasive carcinoma, 22mm, 1/2 lymph nodes positive for metastatic carcinoma, DCIS posterior margin 2mm from new margin \par \par CARE TEAM\par PCP - Dr. Cait Britt\par Cards- [facility] \par Med Onc - Jessica\par Rad onc-Jose\par \par \par INTERVAL HISTORY:\par (05/17/23) Patient presents for followup after recent MRI/PET scan for final surgical planning. SOZO was also performed at [facility] at the time of her MRI. \par \par (06/12/23) : Patient presenting for post op visit, complaining of swelling in the left axilla. Has an appt for followup w/ Dr. Lopez 6/23 and Dr. Garcia 6/27.\par \par (6/19/23): Patient presenting for swelling in the left axilla. Patient had seroma drained last visit, states it has come back larger, discomfort in the area. Seeing Dr. Lopez Friday, Dr. Garcia next week. \par \par \par HPI: \par  Patient is a 82 year old female who presents for an initial consultation 05/04/2023 for newly diagnosed left breast cancer.  She went for routine screening mammogram 4/13/23, which demonstrated a new left breast mass. Diagnostic imaging and biopsy demonstrating a 1.7 cm IDC, grade 2, ER %, MA 31-40%, HER2 2+, EDISON pending, as well as an enlarged left axillary lymph node, biopsy was benign. \par \par \par BREAST HISTORY: Denies breast symptoms. Had a breast cyst aspirated in her 30s, but otherwise denies prior breast biopsies. No prior breast surgeries. \par \par No AC or aspirin\par NKDA, no latex allergy\par Pmhx-HTN\par Fam hx- Mother had breast cancer at 71, niece had breast cancer at 53 \par \par \par IMAGING:\par 04/13/23 - [facility]: Bilateral Screening Mammogram - Density B. Irregular density seen in the left breast. IMPRESSION: An irregular density in the left breast, as marked. Compression views and additional radiographic and sonographic evaluation as needed is recommended. - BIRADS 0: Incomplete. Needs additional imaging evaluation.\par \par 04/20/23 - [facility]: Left Unilateral Diagnostic Mammogram - FINDINGS: Lower outer quadrant spiculated mass persists. IMPRESSION: Spiculated mass with sonographic correlate. - BIRADS 5: Highly Suggestive for Malignancy.\par \par 04/20/23 - [facility]: Complete Left Breast Ultrasound - IMPRESSION: Left breast 5:00 irregular hypoechoic mass corresponding to the mammogram. Left axillary abnormal lymph node. - BIRADS 5: Highly Suggestive for Malignancy.\par \par 04/26/23 - [facility]: Left Breast Ultrasound-Guided Core Needle Biopsy - Q-clips. PATHOLOGY: Left breast mass, 5:00 axis, 1.7 cm (measuring up to 2.0 cm) = Invasive ductal carcinoma, moderately differentiated, and cribriform type ductal carcinoma in situ. Size at least 10 mm. ER/MA +, HER2 EDISON pending. Left axillary lymph node with thickened cortex. Benign lymph node. These findings are concordant with imaging features. However, given the sonographic appearance of the left axillary lymph node, the possibility of metastatic left axillary lymphadenopathy is still considered, which may not have been included in this particular biopsy sample.\par \par 05/04/23 - Jonny: Contrast Enhanced Bilateral Breast MRI - Left slightly lower outer breast is an irregular enhancing mass with spiculated margins corresponding to the biopsy-proven IDC. Mass measures 2.3 x 1.4 x 1.7cm. Abnormal enhancement extends up to 1.1cm from the inferior skin surface, and 1.6cm from the lateral skin surface. IMPRESSION: Biopsy-proven lower slightly outer left breast malignancy measuring up to 2.3 cm marked by Q clip. Recommend continued surgical/oncological management. Multiple (greater than 3) highly prominent left axillary lymph nodes including a level 2 axillary lymph node measuring up to 0.6 cm in cortical thickness. This is of uncertain significance given benign biopsy result of a previously biopsied level 1 axillary lymph node. A 0.6 cm intramammary chain lymph node. No evidence of malignancy in he right breast. - BIRADS 6: Known Biopsy-Proven Malignancy.\par \par 05/08/23 - [facility]: PET CT (Skull Base to Mid Thigh) for Breast Cancer - IMPRESSION: (1) Hypermetabolic outer left breast mass containing biopsy clip consistent with malignancy. (2) Mild FDG avid left axillary node containing biopsy clip showed benign lymph node. Up to 0.6 cm left internal mammary node without significant radiotracer activity. Attention to follow-up. (3) No evidence of abnormal radiotracer activity in the remaining field of view.

## 2023-06-20 NOTE — PROCEDURE
[FreeTextEntry1] : US-guided seroma aspiration  [FreeTextEntry2] : seroma  [FreeTextEntry3] : The area was cleansed with ChloraPrep. Lidocaine was utilized for local anesthesia. Under US-guidance an 18G G needle was utilized to aspirate the seroma. Roughly 150cc of serous fluid was aspirated and discarded. US at the end of the procedure demonstrating near resolution of seroma.

## 2023-07-02 ENCOUNTER — FORM ENCOUNTER (OUTPATIENT)
Age: 83
End: 2023-07-02

## 2023-07-20 PROBLEM — M65.311 TRIGGER FINGER OF RIGHT THUMB: Status: ACTIVE | Noted: 2023-01-19

## 2023-07-20 PROBLEM — M19.042 ARTHRITIS OF HAND, LEFT: Status: ACTIVE | Noted: 2022-03-10

## 2023-07-20 PROBLEM — M19.041 ARTHRITIS OF HAND, RIGHT: Status: ACTIVE | Noted: 2022-03-10

## 2023-07-20 PROBLEM — G56.01 CARPAL TUNNEL SYNDROME OF RIGHT WRIST: Status: ACTIVE | Noted: 2022-04-07

## 2023-07-20 PROBLEM — G56.02 CARPAL TUNNEL SYNDROME OF LEFT WRIST: Status: ACTIVE | Noted: 2022-04-07

## 2023-07-27 ENCOUNTER — APPOINTMENT (OUTPATIENT)
Dept: ORTHOPEDIC SURGERY | Facility: CLINIC | Age: 83
End: 2023-07-27
Payer: MEDICARE

## 2023-07-27 DIAGNOSIS — G56.01 CARPAL TUNNEL SYNDROME, RIGHT UPPER LIMB: ICD-10-CM

## 2023-07-27 DIAGNOSIS — M19.042 PRIMARY OSTEOARTHRITIS, LEFT HAND: ICD-10-CM

## 2023-07-27 DIAGNOSIS — M19.041 PRIMARY OSTEOARTHRITIS, RIGHT HAND: ICD-10-CM

## 2023-07-27 DIAGNOSIS — M65.311 TRIGGER THUMB, RIGHT THUMB: ICD-10-CM

## 2023-07-27 DIAGNOSIS — G56.02 CARPAL TUNNEL SYNDROME, LEFT UPPER LIMB: ICD-10-CM

## 2023-07-27 DIAGNOSIS — M65.332 TRIGGER FINGER, LEFT MIDDLE FINGER: ICD-10-CM

## 2023-07-27 PROCEDURE — 20526 THER INJECTION CARP TUNNEL: CPT | Mod: 50

## 2023-07-27 PROCEDURE — 99214 OFFICE O/P EST MOD 30 MIN: CPT | Mod: 25

## 2023-07-27 RX ORDER — TRIAMCINOLONE ACETONIDE 40 MG/ML
40 SUSPENSION INTRA-ARTERIAL; INTRAMUSCULAR
Qty: 1 | Refills: 0 | Status: COMPLETED | OUTPATIENT
Start: 2023-07-27

## 2023-07-27 RX ORDER — LIDOCAINE HYDROCHLORIDE 10 MG/ML
1 INJECTION, SOLUTION INFILTRATION; PERINEURAL
Refills: 0 | Status: COMPLETED | OUTPATIENT
Start: 2023-07-27

## 2023-07-27 RX ADMIN — TRIAMCINOLONE ACETONIDE 1 MG/ML: 40 INJECTION, SUSPENSION INTRA-ARTICULAR; INTRAMUSCULAR at 00:00

## 2023-07-27 RX ADMIN — LIDOCAINE HYDROCHLORIDE 1 %: 10 INJECTION, SOLUTION INFILTRATION; PERINEURAL at 00:00

## 2023-07-27 NOTE — ADDENDUM
[FreeTextEntry1] : I, Fabi Pearson, acted solely as a scribe for Dr. Payan on this date on 07/27/2023.		\par

## 2023-07-27 NOTE — DISCUSSION/SUMMARY
[FreeTextEntry1] : Kate had a discussion regarding today's visit, the diagnosis and treatment recommendations and options.  We also discussed changes since the last visit.  At this time, as she is quite symptomatic at both carpal tunnels, and she is scheduled to begin radiation therapy for her breast cancer next week, she agreed to proceed with bilateral carpal tunnel cortisone injections.  She does understand that this most likely will not provide her with long-term relief, and if it does not, she will likely require surgical release in the future.\par \par The patient has agreed to the above plan of management and has expressed full understanding.  All questions were fully answered to the patient's satisfaction.\par \par My cumulative time spent on today's visit was greater than 30 minutes and included: Preparation for the visit, review of the medical records, review of pertinent diagnostic studies, examination and counseling of the patient on the above diagnosis, treatment plan and prognosis, orders of diagnostic tests, medications and/or appropriate procedures and documentation in the medical records of today's visit.

## 2023-07-27 NOTE — PROCEDURE
[FreeTextEntry1] : -After a discussion of risks and benefits, the patient agreed to proceed with a cortisone injection.  \par -Side injected: bilateral carpal tunnel.\par -Medications injected:1 cc of 1% Lidocaine and 1 cc of Kenalog, using sterile technique.\par -Ultrasound Guidance: Ultrasound confirmed proper needle localization within the carpal tunnel, thereby protecting the median nerve, prior to the injection.\par -Patient tolerated the procedure well, without complications.\par -Instructions: The patient was was instructed on the use of a carpal tunnel splint, especially at night.\par -Follow-up: Within 4 weeks to assess response to the injection.	\par

## 2023-07-27 NOTE — HISTORY OF PRESENT ILLNESS
[FreeTextEntry1] : Follow-up regarding right carpal tunnel syndrome and right trigger thumb.  See note from when she was last seen in the office greater than 6 months ago.  She was given a cortisone injection of the right carpal tunnel.  She has been previously given 2 cortisone injections at her right middle finger trigger finger.  \par \par She returns today with bilateral hand pain with the left worse than right. Her pain level is 10/10. On June 2nd the patient had breast cancer surgery. She is doing radiology in 10 days. Furthermore, she has difficulty sleeping and she endorses the pain is worse when laying down. She endorses numbness with a pins and needles sensation. In the morning, the patient's hands experience a lock and claw movement. Her  strength is weakened.\par \par She has a history of multiple other trigger fingers as well as bilateral carpal tunnel syndrome and DIP joint arthritis of the digits.  She has had carpal tunnel injections in the past by another physician with relief.\par \par Of note, she was diagnosed with left breast cancer on June 2.  She underwent lumpectomy and is scheduled to begin 30 rounds of radiation therapy next week.  There is axillary node involvement.

## 2023-07-27 NOTE — END OF VISIT
[FreeTextEntry3] : This note was written by Fabi Pearson on 07/27/2023 acting solely as a scribe for Dr. Vega Payan.\par  \par All medical record entries made by the Scribe were at my, Dr. Vega Payan, direction and personally dictated by me on 07/27/2023. I have personally reviewed the chart and agree that the record accurately reflects my personal performance of the history, physical exam, assessment and plan.		\par

## 2023-07-27 NOTE — PHYSICAL EXAM
[de-identified] : - Constitutional: This is a female in no obvious distress.  \par - Psych: Patient is alert and oriented to person, place and time.  Patient has a normal mood and affect.\par - Cardiovascular: Normal pulses throughout the upper extremities.  No significant varicosities are noted in the upper extremities. \par - Neuro: Strength and sensation are intact throughout the upper extremities.  Patient has normal coordination.\par - Respiratory:  Patient exhibits no evidence of shortness of breath or difficulty breathing.\par - Skin: No rashes, lesions, or other abnormalities are noted in the upper extremities.\par \par ---\par \par Examination of her hands demonstrates arthritis at the DIP joints of the digits, most notably at the left ring finger.  There is no swelling or tenderness along the A1 pulley of the left middle finger.  There is triggering.  There is no triggering of the other digits.  She has complaints of constant numbness and tingling throughout the median nerve distribution bilaterally with normal sensation along the radial and ulnar nerve distributions.  There is bilateral thenar atrophy.  Provocative signs for carpal tunnel syndrome are equivocal bilaterally. [de-identified] : Previous AP, lateral, and oblique radiographs of the bilateral hands demonstrated arthritis of the DIP joints of the digits, most notably at the left ring finger and right index finger.  There is mild narrowing of the CMC joints bilaterally.

## 2023-09-19 ENCOUNTER — APPOINTMENT (OUTPATIENT)
Dept: PHYSICAL MEDICINE AND REHAB | Facility: CLINIC | Age: 83
End: 2023-09-19
Payer: MEDICARE

## 2023-09-19 VITALS
HEIGHT: 60 IN | HEART RATE: 86 BPM | BODY MASS INDEX: 31.71 KG/M2 | RESPIRATION RATE: 16 BRPM | OXYGEN SATURATION: 95 % | TEMPERATURE: 97.9 F | DIASTOLIC BLOOD PRESSURE: 83 MMHG | WEIGHT: 161.5 LBS | SYSTOLIC BLOOD PRESSURE: 132 MMHG

## 2023-09-19 PROCEDURE — 93702 BIS XTRACELL FLUID ANALYSIS: CPT

## 2023-09-19 PROCEDURE — 99203 OFFICE O/P NEW LOW 30 MIN: CPT | Mod: 25

## 2023-10-24 ENCOUNTER — NON-APPOINTMENT (OUTPATIENT)
Age: 83
End: 2023-10-24

## 2023-10-24 ENCOUNTER — APPOINTMENT (OUTPATIENT)
Dept: OPHTHALMOLOGY | Facility: CLINIC | Age: 83
End: 2023-10-24
Payer: MEDICARE

## 2023-10-24 PROCEDURE — 99213 OFFICE O/P EST LOW 20 MIN: CPT

## 2023-11-21 ENCOUNTER — APPOINTMENT (OUTPATIENT)
Dept: OPHTHALMOLOGY | Facility: CLINIC | Age: 83
End: 2023-11-21

## 2023-12-05 ENCOUNTER — APPOINTMENT (OUTPATIENT)
Dept: PHYSICAL MEDICINE AND REHAB | Facility: CLINIC | Age: 83
End: 2023-12-05
Payer: MEDICARE

## 2023-12-05 VITALS
WEIGHT: 160 LBS | RESPIRATION RATE: 14 BRPM | BODY MASS INDEX: 32.25 KG/M2 | SYSTOLIC BLOOD PRESSURE: 120 MMHG | HEART RATE: 78 BPM | DIASTOLIC BLOOD PRESSURE: 76 MMHG | HEIGHT: 59 IN

## 2023-12-05 PROCEDURE — 99213 OFFICE O/P EST LOW 20 MIN: CPT | Mod: 25

## 2023-12-05 PROCEDURE — 93702 BIS XTRACELL FLUID ANALYSIS: CPT

## 2023-12-06 ENCOUNTER — APPOINTMENT (OUTPATIENT)
Dept: SURGERY | Facility: CLINIC | Age: 83
End: 2023-12-06

## 2023-12-20 ENCOUNTER — APPOINTMENT (OUTPATIENT)
Dept: SURGERY | Facility: CLINIC | Age: 83
End: 2023-12-20
Payer: MEDICARE

## 2023-12-20 VITALS
WEIGHT: 166 LBS | DIASTOLIC BLOOD PRESSURE: 80 MMHG | BODY MASS INDEX: 33.47 KG/M2 | SYSTOLIC BLOOD PRESSURE: 122 MMHG | HEIGHT: 59 IN

## 2023-12-20 PROCEDURE — 99213 OFFICE O/P EST LOW 20 MIN: CPT

## 2023-12-20 NOTE — ASSESSMENT
[FreeTextEntry1] : 83 year old female with left breast 5:00 4 cm FN, IDC/DCIS, grade 2, ER/NE +, Her 2+ EDISON negative. Measures 1. 7 cm in greatest dimension on US, MRI measures 2. 3 cm in greatest dimension. Palpable axillary adenopathy wiht benign pathology and PET scan without evidence of distant disease. Left kelsey partial mastectomy J-mammoplasty, SLNB on 06/20/2023. Post op complication seroma in left axilla. Completed radiation, previously on anastrozole, stopped due to poor tolerance with plan to start Exemestane.   CBE is benign. Plan for new baselline mammogram/US in April '24. We will see her back in 6 months for next CBE.   Patient verbalized understanding for all treatment plans discussed. All of her questions were answered to the best of my ability. She was encouraged to call the office if any questions or concerns or come in sooner if needed.   Plan: 1. Mammogram/US April '24 2. Followup in 6 months 3. Followup med onc, rad onc, PMR

## 2023-12-20 NOTE — HISTORY OF PRESENT ILLNESS
[FreeTextEntry1] : Oncology History: 1. Left breast 5:00 4 cm FN, cT2N0---> pT2N1a, IDC, DCIS, grade 2, ER/MI +, HER2 2+, EDISON negative     - Measures 1.2 x 1.5 x 1.7 cm mass on US, Abnormal lymph node with cortical thickening (bx benign)     -MRI enhancement measures 2.3 x 1.4 x 1.7cm, within 1.1cm from inferior skin surface      -left kelsey partial mastectomy, J-mammoplasty, SLNB (23)        FINAL PATH: Invasive carcinoma, 22mm, 1/2 lymph nodes positive for metastatic carcinoma, DCIS posterior margin 2mm from new margin        -Radiation completed 2023      -Oncotype      -Started anastrozole -, stopped due to poor tolerance, plan to start Exemstane    CARE TEAM PCP - Dr. Cait Britt Cards- [facility]  Med Onc - Jessica Rad onc-Jose   INTERVAL HISTORY: (23) Patient presents for followup after recent MRI/PET scan for final surgical planning. SOZO was also performed at [facility] at the time of her MRI.   (23) : Patient presenting for post op visit, complaining of swelling in the left axilla. Has an appt for followup w/ Dr. Lopez  and Dr. Garcia .  (23): Patient presenting for swelling in the left axilla. Patient had seroma drained last visit, states it has come back larger, discomfort in the area. Seeing Dr. Lopez Friday, Dr. Garcia next week.   (23): Patient presents for 6 month follow up. Reports recent issues with fatigue, dizziness/unsteadiness, leg cramps, fatigue. Unclear if related to Anastrozole. Was on vacation holiday and feels better off medication. Plan to start Exemstane tonight. She also reports some shooting pain under the arm and into the breast. Concerned for lymphedema. Saw Dr. Rojas, and no evidence of lymphedema at this time. She did start PT.     HPI:   Patient is a 82 year old female who presents for an initial consultation 2023 for newly diagnosed left breast cancer.  She went for routine screening mammogram 23, which demonstrated a new left breast mass. Diagnostic imaging and biopsy demonstrating a 1.7 cm IDC, grade 2, ER %, MI 31-40%, HER2 2+, EDISON pending, as well as an enlarged left axillary lymph node, biopsy was benign.    BREAST HISTORY: Denies breast symptoms. Had a breast cyst aspirated in her 30s, but otherwise denies prior breast biopsies. No prior breast surgeries.   No AC or aspirin NKDA, no latex allergy Pmhx-HTN Fam hx- Mother had breast cancer at 71, niece had breast cancer at 53    [id number] - [facility]: Bilateral Screening Mammogram - Density B. Irregular density seen in the left breast. IMPRESSION: An irregular density in the left breast, as marked. Compression views and additional radiographic and sonographic evaluation as needed is recommended. - BIRADS 0: Incomplete. Needs additional imaging evaluation.  23 - [facility]: Left Unilateral Diagnostic Mammogram - FINDINGS: Lower outer quadrant spiculated mass persists. IMPRESSION: Spiculated mass with sonographic correlate. - BIRADS 5: Highly Suggestive for Malignancy.  23 - [facility]: Complete Left Breast Ultrasound - IMPRESSION: Left breast 5:00 irregular hypoechoic mass corresponding to the mammogram. Left axillary abnormal lymph node. - BIRADS 5: Highly Suggestive for Malignancy.  23 - [facility]: Left Breast Ultrasound-Guided Core Needle Biopsy - Q-clips. PATHOLOGY: Left breast mass, 5:00 axis, 1.7 cm (measuring up to 2.0 cm) = Invasive ductal carcinoma, moderately differentiated, and cribriform type ductal carcinoma in situ. Size at least 10 mm. ER/MI +, HER2 EDISON pending. Left axillary lymph node with thickened cortex. Benign lymph node. These findings are concordant with imaging features. However, given the sonographic appearance of the left axillary lymph node, the possibility of metastatic left axillary lymphadenopathy is still considered, which may not have been included in this particular biopsy sample.  23 - Jonny: Contrast Enhanced Bilateral Breast MRI - Left slightly lower outer breast is an irregular enhancing mass with spiculated margins corresponding to the biopsy-proven IDC. Mass measures 2.3 x 1.4 x 1.7cm. Abnormal enhancement extends up to 1.1cm from the inferior skin surface, and 1.6cm from the lateral skin surface. IMPRESSION: Biopsy-proven lower slightly outer left breast malignancy measuring up to 2.3 cm marked by Q clip. Recommend continued surgical/oncological management. Multiple (greater than 3) highly prominent left axillary lymph nodes including a level 2 axillary lymph node measuring up to 0.6 cm in cortical thickness. This is of uncertain significance given benign biopsy result of a previously biopsied level 1 axillary lymph node. A 0.6 cm intramammary chain lymph node. No evidence of malignancy in he right breast. - BIRADS 6: Known Biopsy-Proven Malignancy.  23 - [facility]: PET CT (Skull Base to Mid Thigh) for Breast Cancer - IMPRESSION: (1) Hypermetabolic outer left breast mass containing biopsy clip consistent with malignancy. (2) Mild FDG avid left axillary node containing biopsy clip showed benign lymph node. Up to 0.6 cm left internal mammary node without significant radiotracer activity. Attention to follow-up. (3) No evidence of abnormal radiotracer activity in the remaining field of view.

## 2023-12-20 NOTE — PHYSICAL EXAM
[Normocephalic] : normocephalic [Atraumatic] : atraumatic [EOMI] : extra ocular movement intact [PERRL] : pupils equal, round and reactive to light [Sclera nonicteric] : sclera nonicteric [Supple] : supple [No Supraclavicular Adenopathy] : no supraclavicular adenopathy [Examined in the supine and seated position] : examined in the supine and seated position [Bra Size: ___] : Bra Size: [unfilled] [Grade 2] : Ptosis Grade 2 [No dominant masses] : no dominant masses in right breast  [No dominant masses] : no dominant masses left breast [No Nipple Retraction] : no left nipple retraction [No Nipple Discharge] : no left nipple discharge [No Edema] : no edema [No Rashes] : no rashes [No Ulceration] : no ulceration [de-identified] : Whit-areolar/lateral breast and axillary incisions well-healed. Mild post-radiation and surgical changes.

## 2023-12-21 ENCOUNTER — NON-APPOINTMENT (OUTPATIENT)
Age: 83
End: 2023-12-21

## 2023-12-21 ENCOUNTER — APPOINTMENT (OUTPATIENT)
Dept: OPHTHALMOLOGY | Facility: CLINIC | Age: 83
End: 2023-12-21
Payer: MEDICARE

## 2023-12-21 PROCEDURE — 92083 EXTENDED VISUAL FIELD XM: CPT

## 2023-12-21 PROCEDURE — 92133 CPTRZD OPH DX IMG PST SGM ON: CPT

## 2023-12-27 ENCOUNTER — NON-APPOINTMENT (OUTPATIENT)
Age: 83
End: 2023-12-27

## 2023-12-27 ENCOUNTER — APPOINTMENT (OUTPATIENT)
Dept: OPHTHALMOLOGY | Facility: CLINIC | Age: 83
End: 2023-12-27
Payer: MEDICARE

## 2023-12-27 PROCEDURE — 99213 OFFICE O/P EST LOW 20 MIN: CPT

## 2024-02-20 ENCOUNTER — APPOINTMENT (OUTPATIENT)
Dept: PHYSICAL MEDICINE AND REHAB | Facility: CLINIC | Age: 84
End: 2024-02-20

## 2024-03-19 ENCOUNTER — APPOINTMENT (OUTPATIENT)
Dept: PHYSICAL MEDICINE AND REHAB | Facility: CLINIC | Age: 84
End: 2024-03-19
Payer: MEDICARE

## 2024-03-19 VITALS
SYSTOLIC BLOOD PRESSURE: 130 MMHG | BODY MASS INDEX: 33.26 KG/M2 | WEIGHT: 165 LBS | DIASTOLIC BLOOD PRESSURE: 76 MMHG | HEIGHT: 59 IN | RESPIRATION RATE: 14 BRPM | HEART RATE: 94 BPM

## 2024-03-19 PROCEDURE — 93702 BIS XTRACELL FLUID ANALYSIS: CPT

## 2024-03-19 PROCEDURE — 99213 OFFICE O/P EST LOW 20 MIN: CPT | Mod: 25

## 2024-03-19 NOTE — PHYSICAL EXAM
[FreeTextEntry1] : Gen: Patient is A&O x 3, NAD HEENT: EOMI, hearing grossly normal Resp: regular, non - labored CV: pulses regular Skin: no rashes, erythema Lymph: No edema in LUE, +Fibrosis in left breast  Inspection: no instability  ROM: full throughout Palpation: TTP left chest wall Sensation: intact to light touch Reflexes: 1+ and symmetric throughout Strength: 5/5 throughout Special tests: -Arredondo sign Gait: normal, non-antalgic  Bioimpedance spectroscopy performed today with L-Dex 10.4 LUE (pre-operative baseline 5.9).

## 2024-03-19 NOTE — ASSESSMENT
[FreeTextEntry1] : 83 year old female presenting for evaluation.  #Breast Cancer: #At risk for lymphedema: -No clinical signs of lymphedema in LUE, patient without subjective symptoms  -Bioimpedance spectroscopy performed today with L-dex elevation, possible stage 0 lymphedema -No clinical signs to suggest DVT -Denies any electronic implants  -Start compression sleeve and gauntlet 20-30mmhg daily-rx sent  -Educated on signs to monitor for and call me immediately  -Patient requires a custom compression garment due to circumference of the proximal portion of limb is significantly greater than the distal limb. Skin/tissue folds or contours require a specific knitting pattern.  #Post-mastectomy pain: -Suspect symptoms secondary to early fibrosis in breast  -S/P PT -Discussed chip pad, she would like to defer -Monitor   Follow up in 1 month.    The patient had a follow-up SOZO measurement which I reviewed today.   Bioimpedance spectroscopy helps identify the onset of lymphedema in an arm or leg before patients experience noticeable swelling. Research has shown that the early detection of lymphedema using L-Dex combined with treatment can reduce progression to chronic lymphedema by 95% in breast cancer patients. Whenever possible, patients are tested for baseline L-Dex score before cancer treatment begins and then are reassessed during regular follow-up visits using the SOZO device. Otherwise, this can be started postoperatively and continued during regular follow-up visits. If the patients L-Dex score increases above normal levels, that is a sign that lymphedema is developing, and a referral is made to physical therapy for further evaluation and/or early compression treatment. Lymphedema assessment with the SOZO L-Dex score is recommended to be done every 3 months for the first 3 years and then every 6 months for years 4 and 5, followed by annually afterwards.

## 2024-03-19 NOTE — HISTORY OF PRESENT ILLNESS
[FreeTextEntry1] : Ms. Sanford is an 83 year old female with Left breast 5:00 4 cm FN, IDC/DCIS, grade 2, ER/[place] +, HER2 2+, EDISON pending. Measures 1.7 cm in greatest dimension on US. MRI measures 2.3cm in greatest dimension. Palpable axillary adenopathy with benign pathology and PET scan without evidence of distant disease. Left kelsey partial mastectomy, J-mammoplasty, SLNB (1/2 lymph nodes positive for metastatic carcinoma on 06/02/2023).  Completed RT on anastrazole.  Reports some tightness on chest wall.  Did PT.  Still feels some swelling breast.  No edema or heaviness in UE.

## 2024-04-16 ENCOUNTER — NON-APPOINTMENT (OUTPATIENT)
Age: 84
End: 2024-04-16

## 2024-04-16 ENCOUNTER — APPOINTMENT (OUTPATIENT)
Dept: PHYSICAL MEDICINE AND REHAB | Facility: CLINIC | Age: 84
End: 2024-04-16
Payer: MEDICARE

## 2024-04-16 VITALS
BODY MASS INDEX: 33.26 KG/M2 | SYSTOLIC BLOOD PRESSURE: 150 MMHG | DIASTOLIC BLOOD PRESSURE: 81 MMHG | HEIGHT: 59 IN | WEIGHT: 165 LBS | HEART RATE: 91 BPM | RESPIRATION RATE: 14 BRPM

## 2024-04-16 DIAGNOSIS — Z91.89 OTHER SPECIFIED PERSONAL RISK FACTORS, NOT ELSEWHERE CLASSIFIED: ICD-10-CM

## 2024-04-16 DIAGNOSIS — G89.28 OTHER CHRONIC POSTPROCEDURAL PAIN: ICD-10-CM

## 2024-04-16 PROCEDURE — 93702 BIS XTRACELL FLUID ANALYSIS: CPT

## 2024-04-16 PROCEDURE — 99213 OFFICE O/P EST LOW 20 MIN: CPT | Mod: 25

## 2024-04-16 NOTE — HISTORY OF PRESENT ILLNESS
[FreeTextEntry1] : Ms. Sanford is an 83 year old female with Left breast 5:00 4 cm FN, IDC/DCIS, grade 2, ER/[place] +, HER2 2+, EDISON pending. Measures 1.7 cm in greatest dimension on US. MRI measures 2.3cm in greatest dimension. Palpable axillary adenopathy with benign pathology and PET scan without evidence of distant disease. Left kelsey partial mastectomy, J-mammoplasty, SLNB (1/2 lymph nodes positive for metastatic carcinoma on 06/02/2023).  Completed RT on anastrazole.  Since her last visit she reports she still is doing good with her range of motion.  Only mild tightness in her chest wall.  Denies any swelling or heaviness in left upper extremity.  She obtained a compression sleeve but thought it would benefit well.  No erythema increased warmth today.

## 2024-04-16 NOTE — PHYSICAL EXAM
[FreeTextEntry1] : Gen: Patient is A&O x 3, NAD HEENT: EOMI, hearing grossly normal Resp: regular, non - labored CV: pulses regular Skin: no rashes, erythema Lymph: No edema in LUE, +Fibrosis in left breast  Inspection: no instability  ROM: full throughout Palpation: TTP left chest wall Sensation: intact to light touch Reflexes: 1+ and symmetric throughout Strength: 5/5 throughout Special tests: -Arredondo sign Gait: normal, non-antalgic  Bioimpedance spectroscopy performed today with L-Dex 9.7 LUE (pre-operative baseline 5.9).

## 2024-04-16 NOTE — ASSESSMENT
[FreeTextEntry1] : 83 year old female presenting for evaluation.  #Breast Cancer: #At risk for lymphedema: -No clinical signs of lymphedema in LUE, patient without subjective symptoms  -Bioimpedance spectroscopy performed today, improvement in L-dex score, below 10 -No clinical signs to suggest DVT -Denies any electronic implants  -compression sleeve and gauntlet 20-30mmhg prn for symptoms  -Educated on signs to monitor for and call me immediately  -Patient requires a custom compression garment due to circumference of the proximal portion of limb is significantly greater than the distal limb. Skin/tissue folds or contours require a specific knitting pattern.  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   Bioimpedance spectroscopy helps identify the onset of lymphedema in an arm or leg before patients experience noticeable swelling. Research has shown that the early detection of lymphedema using L-Dex combined with treatment can reduce progression to chronic lymphedema by 95% in breast cancer patients. Whenever possible, patients are tested for baseline L-Dex score before cancer treatment begins and then are reassessed during regular follow-up visits using the SOZO device. Otherwise, this can be started postoperatively and continued during regular follow-up visits. If the patients L-Dex score increases above normal levels, that is a sign that lymphedema is developing, and a referral is made to physical therapy for further evaluation and/or early compression treatment. Lymphedema assessment with the SOZO L-Dex score is recommended to be done every 3 months for the first 3 years and then every 6 months for years 4 and 5, followed by annually afterwards.

## 2024-04-18 ENCOUNTER — APPOINTMENT (OUTPATIENT)
Dept: ORTHOPEDIC SURGERY | Facility: CLINIC | Age: 84
End: 2024-04-18
Payer: MEDICARE

## 2024-04-18 VITALS — HEIGHT: 59 IN | WEIGHT: 165 LBS | BODY MASS INDEX: 33.26 KG/M2

## 2024-04-18 DIAGNOSIS — Z85.3 PERSONAL HISTORY OF MALIGNANT NEOPLASM OF BREAST: ICD-10-CM

## 2024-04-18 DIAGNOSIS — S46.012A STRAIN OF MUSCLE(S) AND TENDON(S) OF THE ROTATOR CUFF OF LEFT SHOULDER, INITIAL ENCOUNTER: ICD-10-CM

## 2024-04-18 DIAGNOSIS — S40.012A CONTUSION OF LEFT SHOULDER, INITIAL ENCOUNTER: ICD-10-CM

## 2024-04-18 PROCEDURE — 99203 OFFICE O/P NEW LOW 30 MIN: CPT

## 2024-04-18 PROCEDURE — 73030 X-RAY EXAM OF SHOULDER: CPT | Mod: LT

## 2024-04-18 NOTE — HISTORY OF PRESENT ILLNESS
[de-identified] : Date of initial evaluation 04/18/2024 Patient age is 83 years Patient is retired  Body part causing symptoms is the LT shoulder  Symptoms began 4/17/24, pt tripped and fell onto the left shoulder  Location of pain is anterior  Quality of pain is aching, sharp, throbbing  Pain score at rest is 0 Pain score during activity is 10 Radicular symptoms are not present Prior treatments include rest  History of breast cancer, had lumpectomy on the involved side  Patient's condition is not associated with workers compensation, no-fault or interscholastic athletics

## 2024-04-18 NOTE — DISCUSSION/SUMMARY
[de-identified] : History, clinical examination and imaging were most consistent with:  -left shoulder contusion and rotator cuff strain, possible rotator cuff tear  The diagnosis was explained in detail. The potential non-surgical and surgical treatments were reviewed.  The relative risks and benefits of each option were considered relative to the patients age, activity level, medical history, symptom severity and previously attempted treatments.   The patient was advised to consult with their primary medical provider prior to initiation of any new medications to reduce the risk of adverse effects specific to their long-term home medications and medical history. The risk of gastrointestinal irritation and kidney injury specific to long-term NSAID use was discussed.   -Patient defers formal PT and will proceed with a home exercise program. -Over the counter acetaminophen as needed for pain. -The added clinical utility of an MRI was discussed. The patient deferred further diagnostic testing at this time. -Follow up in 6 weeks. If symptoms persist consider MRI.    ([facility])  Problem Complexity -Moderate: acute, complicated injury Risk -Low: over the counter medication  -Patient has not been seen by another provider in my practice within the past 2 years who specializes in orthopedic sports medicine, shoulder or elbow surgery.

## 2024-04-18 NOTE — IMAGING
[de-identified] : (Exam: Shoulder)   Laterality is left   Patient is in no acute distress, alert and oriented Sensation is grossly intact to light touch in the hand Motor function is 5/5 in the hand Capillary refill is less than 2 seconds in the fingers Lymphadenopathy is not present Peripheral edema is not present   Skin is intact Swelling is not present Atrophy is not present Scapular winging is not present Deformity of the AC joint is not present Deformity of the biceps is not present   Bicipital groove tenderness is present Axilla tenderness is present AC joint tenderness is not present Scapulothoracic tenderness is not present   Active forward elevation is 160 Passive forward elevation is 175 External rotation at the side is 60 Internal rotation behind the back is to the level of T12   Forward elevation strength is 5-/5 External rotation strength at the side is 5/5 Internal rotation strength at the side is 5/5 Deltoid strength of anterior, posterior and lateral heads is 5/5   Arredondo test is abnormal OBriens test is abnormal Empty can test is abnormal Speeds test is abnormal Cross body adduction test is normal Belly press test is normal Apprehension and relocation is normal Sulcus sign is normal    [Left] : left shoulder [There are no fractures, subluxations or dislocations. No significant abnormalities are seen] : There are no fractures, subluxations or dislocations. No significant abnormalities are seen

## 2024-05-30 ENCOUNTER — APPOINTMENT (OUTPATIENT)
Dept: ORTHOPEDIC SURGERY | Facility: CLINIC | Age: 84
End: 2024-05-30

## 2024-06-19 ENCOUNTER — NON-APPOINTMENT (OUTPATIENT)
Age: 84
End: 2024-06-19

## 2024-06-19 ENCOUNTER — APPOINTMENT (OUTPATIENT)
Dept: OPHTHALMOLOGY | Facility: CLINIC | Age: 84
End: 2024-06-19
Payer: MEDICARE

## 2024-06-19 PROCEDURE — 76514 ECHO EXAM OF EYE THICKNESS: CPT

## 2024-06-19 PROCEDURE — 92014 COMPRE OPH EXAM EST PT 1/>: CPT

## 2024-06-26 ENCOUNTER — APPOINTMENT (OUTPATIENT)
Dept: SURGERY | Facility: CLINIC | Age: 84
End: 2024-06-26
Payer: MEDICARE

## 2024-06-26 VITALS
WEIGHT: 165 LBS | HEIGHT: 59 IN | SYSTOLIC BLOOD PRESSURE: 138 MMHG | DIASTOLIC BLOOD PRESSURE: 86 MMHG | BODY MASS INDEX: 33.26 KG/M2

## 2024-06-26 DIAGNOSIS — Z17.0 MALIGNANT NEOPLASM OF LOWER-OUTER QUADRANT OF LEFT FEMALE BREAST: ICD-10-CM

## 2024-06-26 DIAGNOSIS — C50.512 MALIGNANT NEOPLASM OF LOWER-OUTER QUADRANT OF LEFT FEMALE BREAST: ICD-10-CM

## 2024-06-26 PROCEDURE — 99213 OFFICE O/P EST LOW 20 MIN: CPT

## 2024-07-16 ENCOUNTER — APPOINTMENT (OUTPATIENT)
Dept: PHYSICAL MEDICINE AND REHAB | Facility: CLINIC | Age: 84
End: 2024-07-16
Payer: MEDICARE

## 2024-07-16 VITALS
WEIGHT: 165 LBS | RESPIRATION RATE: 14 BRPM | HEART RATE: 90 BPM | BODY MASS INDEX: 33.26 KG/M2 | SYSTOLIC BLOOD PRESSURE: 150 MMHG | HEIGHT: 59 IN | DIASTOLIC BLOOD PRESSURE: 89 MMHG

## 2024-07-16 DIAGNOSIS — Z91.89 OTHER SPECIFIED PERSONAL RISK FACTORS, NOT ELSEWHERE CLASSIFIED: ICD-10-CM

## 2024-07-16 DIAGNOSIS — G89.28 OTHER CHRONIC POSTPROCEDURAL PAIN: ICD-10-CM

## 2024-07-16 PROCEDURE — 99214 OFFICE O/P EST MOD 30 MIN: CPT

## 2024-07-16 RX ORDER — LEVOTHYROXINE SODIUM 0.03 MG/1
25 TABLET ORAL
Refills: 0 | Status: ACTIVE | COMMUNITY

## 2024-10-02 ENCOUNTER — NON-APPOINTMENT (OUTPATIENT)
Age: 84
End: 2024-10-02

## 2024-10-15 ENCOUNTER — APPOINTMENT (OUTPATIENT)
Dept: PHYSICAL MEDICINE AND REHAB | Facility: CLINIC | Age: 84
End: 2024-10-15
Payer: MEDICARE

## 2024-10-15 VITALS
HEIGHT: 59 IN | WEIGHT: 165 LBS | SYSTOLIC BLOOD PRESSURE: 154 MMHG | HEART RATE: 89 BPM | BODY MASS INDEX: 33.26 KG/M2 | DIASTOLIC BLOOD PRESSURE: 81 MMHG | RESPIRATION RATE: 14 BRPM

## 2024-10-15 DIAGNOSIS — Z91.89 OTHER SPECIFIED PERSONAL RISK FACTORS, NOT ELSEWHERE CLASSIFIED: ICD-10-CM

## 2024-10-15 DIAGNOSIS — G89.28 OTHER CHRONIC POSTPROCEDURAL PAIN: ICD-10-CM

## 2024-10-15 PROCEDURE — 99213 OFFICE O/P EST LOW 20 MIN: CPT | Mod: 25

## 2024-10-15 PROCEDURE — 93702 BIS XTRACELL FLUID ANALYSIS: CPT

## 2024-12-16 ENCOUNTER — APPOINTMENT (OUTPATIENT)
Facility: CLINIC | Age: 84
End: 2024-12-16
Payer: MEDICARE

## 2024-12-16 VITALS
BODY MASS INDEX: 32.86 KG/M2 | DIASTOLIC BLOOD PRESSURE: 84 MMHG | HEIGHT: 59 IN | HEART RATE: 74 BPM | SYSTOLIC BLOOD PRESSURE: 148 MMHG | WEIGHT: 163 LBS | RESPIRATION RATE: 16 BRPM | OXYGEN SATURATION: 99 %

## 2024-12-16 DIAGNOSIS — Z17.0 MALIGNANT NEOPLASM OF LOWER-OUTER QUADRANT OF LEFT FEMALE BREAST: ICD-10-CM

## 2024-12-16 DIAGNOSIS — C50.512 MALIGNANT NEOPLASM OF LOWER-OUTER QUADRANT OF LEFT FEMALE BREAST: ICD-10-CM

## 2024-12-16 PROCEDURE — 99215 OFFICE O/P EST HI 40 MIN: CPT

## 2024-12-18 ENCOUNTER — APPOINTMENT (OUTPATIENT)
Dept: OPHTHALMOLOGY | Facility: CLINIC | Age: 84
End: 2024-12-18
Payer: MEDICARE

## 2024-12-18 ENCOUNTER — NON-APPOINTMENT (OUTPATIENT)
Age: 84
End: 2024-12-18

## 2024-12-18 PROCEDURE — 92133 CPTRZD OPH DX IMG PST SGM ON: CPT

## 2024-12-18 PROCEDURE — 92083 EXTENDED VISUAL FIELD XM: CPT

## 2025-01-08 ENCOUNTER — APPOINTMENT (OUTPATIENT)
Dept: OPHTHALMOLOGY | Facility: CLINIC | Age: 85
End: 2025-01-08
Payer: MEDICARE

## 2025-01-08 ENCOUNTER — NON-APPOINTMENT (OUTPATIENT)
Age: 85
End: 2025-01-08

## 2025-01-08 PROCEDURE — 92014 COMPRE OPH EXAM EST PT 1/>: CPT

## 2025-04-04 ENCOUNTER — NON-APPOINTMENT (OUTPATIENT)
Age: 85
End: 2025-04-04

## 2025-04-04 DIAGNOSIS — R92.8 OTHER ABNORMAL AND INCONCLUSIVE FINDINGS ON DIAGNOSTIC IMAGING OF BREAST: ICD-10-CM

## 2025-04-10 ENCOUNTER — RESULT REVIEW (OUTPATIENT)
Age: 85
End: 2025-04-10

## 2025-04-15 ENCOUNTER — APPOINTMENT (OUTPATIENT)
Dept: PHYSICAL MEDICINE AND REHAB | Facility: CLINIC | Age: 85
End: 2025-04-15
Payer: MEDICARE

## 2025-04-15 DIAGNOSIS — G89.28 OTHER CHRONIC POSTPROCEDURAL PAIN: ICD-10-CM

## 2025-04-15 DIAGNOSIS — M79.2 NEURALGIA AND NEURITIS, UNSPECIFIED: ICD-10-CM

## 2025-04-15 DIAGNOSIS — I89.0 LYMPHEDEMA, NOT ELSEWHERE CLASSIFIED: ICD-10-CM

## 2025-04-15 PROCEDURE — 99214 OFFICE O/P EST MOD 30 MIN: CPT | Mod: 25

## 2025-04-15 PROCEDURE — 93702 BIS XTRACELL FLUID ANALYSIS: CPT

## 2025-04-15 RX ORDER — PREGABALIN 25 MG/1
25 CAPSULE ORAL
Qty: 60 | Refills: 1 | Status: ACTIVE | COMMUNITY
Start: 2025-04-15 | End: 1900-01-01

## 2025-05-05 ENCOUNTER — NON-APPOINTMENT (OUTPATIENT)
Age: 85
End: 2025-05-05

## 2025-05-07 ENCOUNTER — APPOINTMENT (OUTPATIENT)
Dept: PHYSICAL MEDICINE AND REHAB | Facility: CLINIC | Age: 85
End: 2025-05-07
Payer: MEDICARE

## 2025-05-07 VITALS
BODY MASS INDEX: 33.26 KG/M2 | HEART RATE: 86 BPM | SYSTOLIC BLOOD PRESSURE: 152 MMHG | HEIGHT: 59 IN | DIASTOLIC BLOOD PRESSURE: 82 MMHG | WEIGHT: 165 LBS | OXYGEN SATURATION: 96 %

## 2025-05-07 DIAGNOSIS — M48.061 SPINAL STENOSIS, LUMBAR REGION WITHOUT NEUROGENIC CLAUDICATION: ICD-10-CM

## 2025-05-07 DIAGNOSIS — M79.18 MYALGIA, OTHER SITE: ICD-10-CM

## 2025-05-07 PROCEDURE — 99215 OFFICE O/P EST HI 40 MIN: CPT | Mod: 25

## 2025-05-07 PROCEDURE — 20553 NJX 1/MLT TRIGGER POINTS 3/>: CPT

## 2025-05-14 ENCOUNTER — APPOINTMENT (OUTPATIENT)
Dept: PHYSICAL MEDICINE AND REHAB | Facility: CLINIC | Age: 85
End: 2025-05-14
Payer: MEDICARE

## 2025-05-14 VITALS
HEIGHT: 59 IN | DIASTOLIC BLOOD PRESSURE: 78 MMHG | WEIGHT: 160 LBS | HEART RATE: 88 BPM | BODY MASS INDEX: 32.25 KG/M2 | RESPIRATION RATE: 14 BRPM | SYSTOLIC BLOOD PRESSURE: 160 MMHG

## 2025-05-14 DIAGNOSIS — Z91.89 OTHER SPECIFIED PERSONAL RISK FACTORS, NOT ELSEWHERE CLASSIFIED: ICD-10-CM

## 2025-05-14 DIAGNOSIS — M47.816 SPONDYLOSIS W/OUT MYELOPATHY OR RADICULOPATHY, LUMBAR REGION: ICD-10-CM

## 2025-05-14 PROCEDURE — 99213 OFFICE O/P EST LOW 20 MIN: CPT

## 2025-06-20 ENCOUNTER — APPOINTMENT (OUTPATIENT)
Dept: PHYSICAL MEDICINE AND REHAB | Facility: CLINIC | Age: 85
End: 2025-06-20

## 2025-06-20 VITALS
DIASTOLIC BLOOD PRESSURE: 87 MMHG | OXYGEN SATURATION: 95 % | HEART RATE: 86 BPM | HEIGHT: 59 IN | BODY MASS INDEX: 32.25 KG/M2 | SYSTOLIC BLOOD PRESSURE: 191 MMHG | WEIGHT: 160 LBS

## 2025-06-20 VITALS — DIASTOLIC BLOOD PRESSURE: 81 MMHG | SYSTOLIC BLOOD PRESSURE: 153 MMHG

## 2025-06-20 PROBLEM — M70.60 GREATER TROCHANTERIC BURSITIS: Status: ACTIVE | Noted: 2025-06-20

## 2025-06-20 PROCEDURE — 20611 DRAIN/INJ JOINT/BURSA W/US: CPT | Mod: RT

## 2025-06-20 PROCEDURE — 99214 OFFICE O/P EST MOD 30 MIN: CPT | Mod: 25

## 2025-06-25 ENCOUNTER — APPOINTMENT (OUTPATIENT)
Facility: CLINIC | Age: 85
End: 2025-06-25
Payer: MEDICARE

## 2025-06-25 VITALS
OXYGEN SATURATION: 99 % | RESPIRATION RATE: 16 BRPM | WEIGHT: 160 LBS | HEART RATE: 74 BPM | DIASTOLIC BLOOD PRESSURE: 80 MMHG | BODY MASS INDEX: 32.25 KG/M2 | SYSTOLIC BLOOD PRESSURE: 124 MMHG | HEIGHT: 59 IN

## 2025-06-25 PROCEDURE — 93702 BIS XTRACELL FLUID ANALYSIS: CPT

## 2025-06-25 PROCEDURE — 99215 OFFICE O/P EST HI 40 MIN: CPT | Mod: 25

## 2025-07-09 ENCOUNTER — APPOINTMENT (OUTPATIENT)
Dept: PHYSICAL MEDICINE AND REHAB | Facility: CLINIC | Age: 85
End: 2025-07-09
Payer: MEDICARE

## 2025-07-09 VITALS
HEIGHT: 59 IN | DIASTOLIC BLOOD PRESSURE: 81 MMHG | SYSTOLIC BLOOD PRESSURE: 152 MMHG | BODY MASS INDEX: 32.25 KG/M2 | HEART RATE: 88 BPM | RESPIRATION RATE: 14 BRPM | WEIGHT: 160 LBS

## 2025-07-09 PROCEDURE — 99213 OFFICE O/P EST LOW 20 MIN: CPT

## 2025-07-15 ENCOUNTER — APPOINTMENT (OUTPATIENT)
Dept: OPHTHALMOLOGY | Facility: CLINIC | Age: 85
End: 2025-07-15
Payer: MEDICARE

## 2025-07-15 ENCOUNTER — NON-APPOINTMENT (OUTPATIENT)
Age: 85
End: 2025-07-15

## 2025-07-15 PROCEDURE — 99213 OFFICE O/P EST LOW 20 MIN: CPT

## 2025-07-15 PROCEDURE — 92083 EXTENDED VISUAL FIELD XM: CPT

## 2025-07-23 ENCOUNTER — APPOINTMENT (OUTPATIENT)
Dept: PHYSICAL MEDICINE AND REHAB | Facility: CLINIC | Age: 85
End: 2025-07-23
Payer: MEDICARE

## 2025-07-23 VITALS
BODY MASS INDEX: 34.63 KG/M2 | OXYGEN SATURATION: 96 % | HEIGHT: 58 IN | HEART RATE: 76 BPM | DIASTOLIC BLOOD PRESSURE: 78 MMHG | SYSTOLIC BLOOD PRESSURE: 138 MMHG | WEIGHT: 165 LBS

## 2025-07-23 DIAGNOSIS — M47.816 SPONDYLOSIS W/OUT MYELOPATHY OR RADICULOPATHY, LUMBAR REGION: ICD-10-CM

## 2025-07-23 DIAGNOSIS — M79.18 MYALGIA, OTHER SITE: ICD-10-CM

## 2025-07-23 PROCEDURE — 20553 NJX 1/MLT TRIGGER POINTS 3/>: CPT

## 2025-07-23 PROCEDURE — 99214 OFFICE O/P EST MOD 30 MIN: CPT | Mod: 25

## 2025-08-17 ENCOUNTER — NON-APPOINTMENT (OUTPATIENT)
Age: 85
End: 2025-08-17

## 2025-09-03 ENCOUNTER — APPOINTMENT (OUTPATIENT)
Dept: PHYSICAL MEDICINE AND REHAB | Facility: CLINIC | Age: 85
End: 2025-09-03
Payer: MEDICARE

## 2025-09-03 VITALS
HEIGHT: 58 IN | DIASTOLIC BLOOD PRESSURE: 79 MMHG | SYSTOLIC BLOOD PRESSURE: 156 MMHG | BODY MASS INDEX: 34.63 KG/M2 | HEART RATE: 102 BPM | OXYGEN SATURATION: 96 % | WEIGHT: 165 LBS

## 2025-09-03 DIAGNOSIS — M48.061 SPINAL STENOSIS, LUMBAR REGION WITHOUT NEUROGENIC CLAUDICATION: ICD-10-CM

## 2025-09-03 DIAGNOSIS — M47.816 SPONDYLOSIS W/OUT MYELOPATHY OR RADICULOPATHY, LUMBAR REGION: ICD-10-CM

## 2025-09-03 DIAGNOSIS — M70.60 TROCHANTERIC BURSITIS, UNSPECIFIED HIP: ICD-10-CM

## 2025-09-03 DIAGNOSIS — M79.18 MYALGIA, OTHER SITE: ICD-10-CM

## 2025-09-03 PROCEDURE — 20553 NJX 1/MLT TRIGGER POINTS 3/>: CPT

## 2025-09-03 PROCEDURE — 99214 OFFICE O/P EST MOD 30 MIN: CPT | Mod: 25
